# Patient Record
Sex: FEMALE | Race: WHITE | Employment: OTHER | URBAN - METROPOLITAN AREA
[De-identification: names, ages, dates, MRNs, and addresses within clinical notes are randomized per-mention and may not be internally consistent; named-entity substitution may affect disease eponyms.]

---

## 2021-04-06 ENCOUNTER — TELEPHONE (OUTPATIENT)
Dept: FAMILY MEDICINE CLINIC | Facility: CLINIC | Age: 86
End: 2021-04-06

## 2021-04-06 ENCOUNTER — OFFICE VISIT (OUTPATIENT)
Dept: FAMILY MEDICINE CLINIC | Facility: CLINIC | Age: 86
End: 2021-04-06
Payer: MEDICARE

## 2021-04-06 VITALS
HEART RATE: 64 BPM | RESPIRATION RATE: 18 BRPM | WEIGHT: 123 LBS | BODY MASS INDEX: 23.52 KG/M2 | HEIGHT: 60.5 IN | TEMPERATURE: 98 F | SYSTOLIC BLOOD PRESSURE: 134 MMHG | DIASTOLIC BLOOD PRESSURE: 70 MMHG

## 2021-04-06 DIAGNOSIS — N39.41 URGE URINARY INCONTINENCE: ICD-10-CM

## 2021-04-06 DIAGNOSIS — I10 BENIGN ESSENTIAL HYPERTENSION: Primary | ICD-10-CM

## 2021-04-06 DIAGNOSIS — E55.9 VITAMIN D DEFICIENCY: ICD-10-CM

## 2021-04-06 DIAGNOSIS — Z95.0 PRESENCE OF CARDIAC PACEMAKER: ICD-10-CM

## 2021-04-06 DIAGNOSIS — S81.801A OPEN WOUND OF RIGHT LOWER LEG, INITIAL ENCOUNTER: ICD-10-CM

## 2021-04-06 DIAGNOSIS — E78.00 PURE HYPERCHOLESTEROLEMIA: ICD-10-CM

## 2021-04-06 PROCEDURE — 93000 ELECTROCARDIOGRAM COMPLETE: CPT | Performed by: FAMILY MEDICINE

## 2021-04-06 PROCEDURE — 90471 IMMUNIZATION ADMIN: CPT | Performed by: FAMILY MEDICINE

## 2021-04-06 PROCEDURE — 99205 OFFICE O/P NEW HI 60 MIN: CPT | Performed by: FAMILY MEDICINE

## 2021-04-06 PROCEDURE — 90714 TD VACC NO PRESV 7 YRS+ IM: CPT | Performed by: FAMILY MEDICINE

## 2021-04-06 RX ORDER — ATORVASTATIN CALCIUM 10 MG/1
10 TABLET, FILM COATED ORAL DAILY
COMMUNITY
Start: 2021-03-18

## 2021-04-06 RX ORDER — METOPROLOL SUCCINATE 50 MG/1
50 TABLET, EXTENDED RELEASE ORAL DAILY
COMMUNITY
Start: 2021-03-18 | End: 2021-10-05

## 2021-04-06 RX ORDER — HYDROXYZINE HYDROCHLORIDE 25 MG/1
25 TABLET, FILM COATED ORAL
COMMUNITY
Start: 2021-03-18 | End: 2021-04-06

## 2021-04-06 RX ORDER — HYDROXYZINE HYDROCHLORIDE 25 MG/1
25 TABLET, FILM COATED ORAL 3 TIMES DAILY
COMMUNITY
End: 2021-04-29

## 2021-04-06 RX ORDER — HYDROXYZINE HYDROCHLORIDE 25 MG/1
25 TABLET, FILM COATED ORAL 3 TIMES DAILY
COMMUNITY
End: 2021-04-06

## 2021-04-06 NOTE — PROGRESS NOTES
HPI/Subjective:   Sylvia Bhardwaj is a 80year old female who presents for Consult (new pt. )     The patient is a 55-year-old female here for her first visit to our office. She has moved here from [place].   She is known to have a past history of PPM (Mo symmetrical, trachea midline and thyroid not enlarged, symmetric, no tenderness/mass/nodules  Lungs: clear to auscultation bilaterally  Heart: S1, S2 normal, no murmur, click, rub or gallop, regular rate and rhythm  Abdomen: soft, non-tender; bowel sounds

## 2021-04-06 NOTE — TELEPHONE ENCOUNTER
It is a type of antihistamine but does cause some sedation so it is used for anxiety/agitation.   If the daughter feels that this has been helpful we will leave it as it is

## 2021-04-06 NOTE — TELEPHONE ENCOUNTER
Call to Gail/daughter-listed on [employer] consent-home # in record reaches identified voice mail. Per hip[employer] consent, left m req call back to triage nurse today for dr instructions. Provided ofc phone hours.

## 2021-04-06 NOTE — TELEPHONE ENCOUNTER
Patient's daughter [facility] Verbal Release verified) was in and was told to call in with medication that was not listed. Hydroxyzine 25 mg PO TID -->has been taking for about 2.5 weeks. Daughter states that if Dr. Jens Hilton does not feel that this is not the right medication for her, she would like for it to be changed.     Medication list updated    Dr. Jens Hilton to advise, if any

## 2021-04-07 NOTE — TELEPHONE ENCOUNTER
Call to Gail/daughter-listed on hip[employer] consent-home # in record reaches identified voice mail. Per hipaa consent, left m req call back to triage nurse tomorrow for dr cooley. Provided ofc phone hours.

## 2021-04-08 ENCOUNTER — MED REC SCAN ONLY (OUTPATIENT)
Dept: FAMILY MEDICINE CLINIC | Facility: CLINIC | Age: 86
End: 2021-04-08

## 2021-04-12 PROBLEM — Z95.0 PRESENCE OF CARDIAC PACEMAKER: Status: ACTIVE | Noted: 2021-04-12

## 2021-04-12 PROBLEM — E55.9 VITAMIN D DEFICIENCY: Status: ACTIVE | Noted: 2021-04-12

## 2021-04-12 PROBLEM — E78.00 PURE HYPERCHOLESTEROLEMIA: Status: ACTIVE | Noted: 2021-04-12

## 2021-04-12 PROBLEM — N39.41 URGE URINARY INCONTINENCE: Status: ACTIVE | Noted: 2021-04-12

## 2021-04-12 PROBLEM — I10 BENIGN ESSENTIAL HYPERTENSION: Status: ACTIVE | Noted: 2021-04-12

## 2021-04-13 ENCOUNTER — OFFICE VISIT (OUTPATIENT)
Dept: FAMILY MEDICINE CLINIC | Facility: CLINIC | Age: 86
End: 2021-04-13
Payer: MEDICARE

## 2021-04-13 VITALS
DIASTOLIC BLOOD PRESSURE: 84 MMHG | BODY MASS INDEX: 23.52 KG/M2 | WEIGHT: 123 LBS | SYSTOLIC BLOOD PRESSURE: 146 MMHG | HEART RATE: 80 BPM | TEMPERATURE: 98 F | RESPIRATION RATE: 18 BRPM | HEIGHT: 60.5 IN

## 2021-04-13 DIAGNOSIS — S81.801D OPEN WOUND OF RIGHT LOWER LEG, SUBSEQUENT ENCOUNTER: ICD-10-CM

## 2021-04-13 DIAGNOSIS — L03.115 CELLULITIS OF RIGHT LOWER LEG: Primary | ICD-10-CM

## 2021-04-13 PROCEDURE — 99213 OFFICE O/P EST LOW 20 MIN: CPT | Performed by: FAMILY MEDICINE

## 2021-04-13 RX ORDER — AMOXICILLIN AND CLAVULANATE POTASSIUM 875; 125 MG/1; MG/1
1 TABLET, FILM COATED ORAL 2 TIMES DAILY
Qty: 14 TABLET | Refills: 0 | Status: SHIPPED | OUTPATIENT
Start: 2021-04-13 | End: 2021-04-20

## 2021-04-13 NOTE — TELEPHONE ENCOUNTER
SUSANNAH on daughter's cell patient has an appointment today to talk to  about this-I will close this encounter.

## 2021-04-19 ENCOUNTER — HOSPITAL ENCOUNTER (EMERGENCY)
Facility: HOSPITAL | Age: 86
Discharge: HOME | End: 2021-04-19
Attending: EMERGENCY MEDICINE
Payer: MEDICARE

## 2021-04-19 ENCOUNTER — APPOINTMENT (OUTPATIENT)
Dept: GENERAL RADIOLOGY | Facility: HOSPITAL | Age: 86
End: 2021-04-19
Attending: EMERGENCY MEDICINE
Payer: MEDICARE

## 2021-04-19 VITALS
RESPIRATION RATE: 18 BRPM | WEIGHT: 124 LBS | HEART RATE: 65 BPM | BODY MASS INDEX: 22.82 KG/M2 | SYSTOLIC BLOOD PRESSURE: 133 MMHG | TEMPERATURE: 98 F | DIASTOLIC BLOOD PRESSURE: 78 MMHG | OXYGEN SATURATION: 95 % | HEIGHT: 62 IN
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
## 2021-04-19 PROCEDURE — 99283 EMERGENCY DEPT VISIT LOW MDM: CPT

## 2021-04-19 PROCEDURE — 72170 X-RAY EXAM OF PELVIS: CPT | Performed by: EMERGENCY MEDICINE

## 2021-04-19 NOTE — ED INITIAL ASSESSMENT (HPI)
PT TO ED FROM HOME AFTER SLIPPING ON  WOOD FLOOR, DENIES HITTING HEAD OR LOC, NO BLOOD THINNERS.  DENIES INJURY OR PAIN

## 2021-04-19 NOTE — ED PROVIDER NOTES
Patient Seen in: [facility] Emergency Department      History   Patient presents with:  Fall    Stated Complaint: fall     HPI/Subjective:   HPI    59-year-old female with past medical history of hypertension, hyperlipidemia presents today following and reactive to light. Cardiovascular:      Rate and Rhythm: Normal rate and regular rhythm. Pulmonary:      Effort: Pulmonary effort is normal.      Breath sounds: Normal breath sounds. Abdominal:      Palpations: Abdomen is soft. Tenderness:  Jacquelynne Greet Rosalino Montes MD on 4/19/2021 at 1:37 PM              MDM      26-year-old female presents today following a mechanical fall. During my assessment, patient denies any physical complaints.   Her GCS is 15 and she has no gross neurological abnormalities on examinat

## 2021-04-19 NOTE — PROGRESS NOTES
Wound follow-up    Patient is here for follow-up of her leg wound of her right anterior shin. Her daughter believes the wound is smaller but she is now seen some erythema surrounding the 2 main areas. There has been no fever.   /84 (BP Location: Left

## 2021-04-29 ENCOUNTER — TELEPHONE (OUTPATIENT)
Dept: FAMILY MEDICINE CLINIC | Facility: CLINIC | Age: 86
End: 2021-04-29

## 2021-04-29 ENCOUNTER — PATIENT MESSAGE (OUTPATIENT)
Dept: FAMILY MEDICINE CLINIC | Facility: CLINIC | Age: 86
End: 2021-04-29

## 2021-04-29 RX ORDER — HYDROXYZINE HYDROCHLORIDE 25 MG/1
25 TABLET, FILM COATED ORAL 3 TIMES DAILY PRN
Qty: 30 TABLET | Refills: 0 | Status: SHIPPED | OUTPATIENT
Start: 2021-04-29 | End: 2021-05-03

## 2021-04-29 NOTE — TELEPHONE ENCOUNTER
Left msg to pt  callback    Pls clarify  Indication why taking? Epic showing external provider     Dr. Isabel Mast to refill this?    Pt has a recent ER visit d/t fall 4/19

## 2021-04-29 NOTE — TELEPHONE ENCOUNTER
Pt daughter would like to know if pt can have refill of hydrOXYzine HCl 25 MG Oral Tab sent to [facility] [facility], [address], [phone], [phone] please advise thank you pt is out of medication

## 2021-04-30 NOTE — TELEPHONE ENCOUNTER
Pt daughter called back on this. Confirms the rx is for anxiety, rx'd by previous Dr when pt was living in [place]. I let her know Dr Lola Styles to refill but the RN wanted to get clarification on the rx d/t pt's fall risk. Daughter voiced understanding.  Pt has a

## 2021-04-30 NOTE — TELEPHONE ENCOUNTER
LM for pt.s daughter Iqra Luna to [facility]. Derek Eli is the only one on Mothers HIPPA. My chart message was sent by pt.s  daughter in law., Dangelo Baez. Dr. Keira Mancia did recommend pt go to the UC or ER.

## 2021-04-30 NOTE — TELEPHONE ENCOUNTER
From: Shana Khan  To: Tia Doll MD  Sent: 4/29/2021 9:55 PM CDT  Subject: Visit Follow-up Question    I'm Willie Amaral, Sabine's daughter-in-law in [place] and a mental health professional. Emily Hurst had a period of coherence 4/15-20.  During the pa

## 2021-05-01 ENCOUNTER — APPOINTMENT (OUTPATIENT)
Dept: CT IMAGING | Facility: HOSPITAL | Age: 86
End: 2021-05-01
Attending: EMERGENCY MEDICINE
Payer: MEDICARE

## 2021-05-01 ENCOUNTER — HOSPITAL ENCOUNTER (EMERGENCY)
Facility: HOSPITAL | Age: 86
Discharge: HOME | End: 2021-05-01
Attending: EMERGENCY MEDICINE
Payer: MEDICARE

## 2021-05-01 VITALS
TEMPERATURE: 97 F | BODY MASS INDEX: 23 KG/M2 | WEIGHT: 125 LBS | HEIGHT: 62 IN | OXYGEN SATURATION: 96 % | SYSTOLIC BLOOD PRESSURE: 132 MMHG | DIASTOLIC BLOOD PRESSURE: 77 MMHG | RESPIRATION RATE: 18 BRPM | HEART RATE: 66 BPM

## 2021-05-01 DIAGNOSIS — S00.03XA CONTUSION OF SCALP, INITIAL ENCOUNTER: Primary | ICD-10-CM

## 2021-05-01 PROCEDURE — 70450 CT HEAD/BRAIN W/O DYE: CPT | Performed by: EMERGENCY MEDICINE

## 2021-05-01 PROCEDURE — 99284 EMERGENCY DEPT VISIT MOD MDM: CPT

## 2021-05-01 NOTE — ED INITIAL ASSESSMENT (HPI)
Pt here after losing balance in her assisted living facility. +hitting posterior head. Denies loc head neck/back pain. AAOx 3.

## 2021-05-01 NOTE — ED PROVIDER NOTES
Patient Seen in: [facility] Emergency Department      History   Patient presents with:  Fall    Stated Complaint: fall    HPI/Subjective:   HPI    63-year-old white female presents emerged from today for complaint of fall.   Patient that she turned a auscultation bilaterally. Heart is regular rate and rhythm without murmur gallop or rub    Abdomen is soft nondistended nontender to deep palpation there is no rebound or guarding noted no hepatosplenomegaly is noted. No masses are noted.   No hernias are encounter diagnosis)     Disposition:  There is no disposition on file for this visit. There is no disposition time on file for this visit.     Follow-up:  MD Kay Donnelly [phone]    In 2 days

## 2021-05-04 NOTE — PROGRESS NOTES
HPI/Subjective:   Adonay Powell is a 80year old female who presents for Follow - Up (cellulitis right lower leg. )     The patient is here for follow-up of her cellulitis of her right lower leg. She states that she has been feeling well.   There has bee rhythm  Extremities:  There are 2 healing scabs noted on the anterior right shin  EKG: Pacemaker evident, no acute changes    Assessment & Plan:   Problem List Items Addressed This Visit     None      Visit Diagnoses     Anxiety    -  Primary    Relevant Me

## 2021-05-06 ENCOUNTER — TELEPHONE (OUTPATIENT)
Dept: FAMILY MEDICINE CLINIC | Facility: CLINIC | Age: 86
End: 2021-05-06

## 2021-05-06 NOTE — TELEPHONE ENCOUNTER
Dr. Omar Armstrong (Podiatrist) called and states he saw pt and dx'd her with Infected Ulcer left 3 rd toe. Would like to treat her with Keflex 500 mg tid and probiotic.   Wanted to know if you are okay with the treatment plan since pt is new with in their off

## 2021-05-21 DIAGNOSIS — F41.9 ANXIETY: ICD-10-CM

## 2021-05-21 RX ORDER — ALPRAZOLAM 0.25 MG/1
0.25 TABLET ORAL 3 TIMES DAILY PRN
Qty: 30 TABLET | Refills: 0 | Status: SHIPPED | OUTPATIENT
Start: 2021-05-21 | End: 2021-05-27

## 2021-05-21 NOTE — TELEPHONE ENCOUNTER
Daughter Lorenza Monahan called and states pt is out of her medication. Had a 10 day starter of it. Everything went well no side effects and dizziness is gone.  She states her mom lives in a nursing home setting and would like to have a month supply at a time so sh

## 2021-05-26 NOTE — TELEPHONE ENCOUNTER
Call from dmitriy/daughter-listed on hip[employer] consent-mentions initial call info requesting 90 day alprazolam refill order instead of 10 day order at a time.    Reports pt \"doing very well, so much better since starting this med, it's almost like I have my own

## 2021-05-27 RX ORDER — ALPRAZOLAM 0.25 MG/1
0.25 TABLET ORAL 3 TIMES DAILY PRN
Qty: 90 TABLET | Refills: 2 | Status: SHIPPED | OUTPATIENT
Start: 2021-05-27 | End: 2021-10-28

## 2021-06-08 ENCOUNTER — TELEPHONE (OUTPATIENT)
Dept: FAMILY MEDICINE CLINIC | Facility: CLINIC | Age: 86
End: 2021-06-08

## 2021-06-09 ENCOUNTER — TELEPHONE (OUTPATIENT)
Dept: FAMILY MEDICINE CLINIC | Facility: CLINIC | Age: 86
End: 2021-06-09

## 2021-06-15 ENCOUNTER — TELEPHONE (OUTPATIENT)
Dept: FAMILY MEDICINE CLINIC | Facility: CLINIC | Age: 86
End: 2021-06-15

## 2021-06-15 NOTE — TELEPHONE ENCOUNTER
Staff member calling from  [facility] in [place] asking for H&P, med list, assessment form that was dropped off, CXR results, vaccinations. Still awaiting daughter's call about this.

## 2021-06-15 NOTE — TELEPHONE ENCOUNTER
LMOVM for pt.s daughter Smita Peers to [facility]. ( she is on her mothers HIPPA) I asked her to please ask for me. It is regarding the paperwork she dropped off for her mother.

## 2021-06-15 NOTE — TELEPHONE ENCOUNTER
Call from [address] RN/HUNG villar/ivanna-sts pt's family making arrangements fro pt to be transferred to this facility tomorrow. sts still waiting for clinical info from our ofc.    Advised adebayo of all info noted below-still waiting for call back fr

## 2021-06-16 ENCOUNTER — TELEPHONE (OUTPATIENT)
Dept: FAMILY MEDICINE CLINIC | Facility: CLINIC | Age: 86
End: 2021-06-16

## 2021-06-16 DIAGNOSIS — Z11.1 SCREENING FOR TUBERCULOSIS: Primary | ICD-10-CM

## 2021-06-16 PROCEDURE — 87086 URINE CULTURE/COLONY COUNT: CPT | Performed by: NURSE PRACTITIONER

## 2021-06-16 PROCEDURE — 81001 URINALYSIS AUTO W/SCOPE: CPT | Performed by: NURSE PRACTITIONER

## 2021-06-16 NOTE — TELEPHONE ENCOUNTER
Per Dr Gerardo Galvan he said if Herrera  is willing to sign/address this then she can complete today. Otherwise will have to wait until tomorrow. S/w daughter. She agrees to have chaz see her mom today on this. chaz updated.

## 2021-06-16 NOTE — PROGRESS NOTES
Em Lion is a 80year old female. HPI:   Patient presents today with her grandson, Stefanie Dutta for form completion and assessment.  She is currently living at [facility]- in independent living and grandson reports 1 week ago she was found wander use: Never       REVIEW OF SYSTEMS:   GENERAL HEALTH: feels well otherwise  RESPIRATORY: denies shortness of breath with exertion  CARDIOVASCULAR: denies chest pain on exertion  GI: denies abdominal pain  NEURO: denies headaches  Musculoskeletal: No motor president is. Patient frustrated that she cannot remember these answers. Discussed follow up with neurology for full evaluation regarding dementia. Referral placed for Dr. Elvira Kelly. Forms completed with assistance of daughter, Karyn Kamara available by phone.

## 2021-06-16 NOTE — TELEPHONE ENCOUNTER
Igor Blackman from [facility] called. Asking for 2 things  1. H/p from today  2. An order for quanteferon gold tb test or chest xray. They need this order today in order for pt to stay there. They have a lab that will draw her there tomorrow.      Per [facility]

## 2021-06-16 NOTE — TELEPHONE ENCOUNTER
Daughter called back on this. Mariama Celis this needs to be completed by today. I let her know I will need to call her back and locate forms Destiny she is referring to as destiny is not here today. She is ok in awaiting me to call back.

## 2021-06-16 NOTE — TELEPHONE ENCOUNTER
Daughter calling again on this. I told her I did locate form but it needs to be signed by Dr Kirk Velazquez and he is not here. Also the form is asking for assessment of her adl's. I told daughter I sent note to Dr Kirk Velazquez (secure chat) to see how to proceed on this.

## 2021-06-17 ENCOUNTER — NURSE ONLY (OUTPATIENT)
Dept: LAB | Age: 86
End: 2021-06-17
Attending: FAMILY MEDICINE
Payer: MEDICARE

## 2021-06-17 DIAGNOSIS — Z11.1 SCREENING-PULMONARY TB: Primary | ICD-10-CM

## 2021-06-17 PROCEDURE — 86480 TB TEST CELL IMMUN MEASURE: CPT

## 2021-06-22 ENCOUNTER — TELEPHONE (OUTPATIENT)
Dept: FAMILY MEDICINE CLINIC | Facility: CLINIC | Age: 86
End: 2021-06-22

## 2021-06-22 NOTE — TELEPHONE ENCOUNTER
Coretta Holter from [employer] called and would like to verify pt's Alprazolam 0.25 MG. Pt is new to them and would like to make sure.   Per Mandi Joyner, pt's medications states she is to take Alprazolam 025 mg 1 po TID as needed, but pt's

## 2021-06-24 ENCOUNTER — NURSE ONLY (OUTPATIENT)
Dept: LAB | Age: 86
End: 2021-06-24
Attending: FAMILY MEDICINE
Payer: MEDICARE

## 2021-06-24 DIAGNOSIS — Z11.1 SCREENING FOR TUBERCULOSIS: ICD-10-CM

## 2021-06-24 DIAGNOSIS — E55.9 VITAMIN D DEFICIENCY: ICD-10-CM

## 2021-06-24 DIAGNOSIS — D51.9 VITAMIN B12 DEFICIENCY ANEMIA: ICD-10-CM

## 2021-06-24 DIAGNOSIS — F41.9 ANXIETY: ICD-10-CM

## 2021-06-24 DIAGNOSIS — I10 ESSENTIAL HYPERTENSION, MALIGNANT: Primary | ICD-10-CM

## 2021-06-24 DIAGNOSIS — Z13.29 SCREENING FOR THYROID DISORDER: ICD-10-CM

## 2021-06-24 DIAGNOSIS — E78.5 HYPERLIPIDEMIA: ICD-10-CM

## 2021-06-24 PROCEDURE — 86480 TB TEST CELL IMMUN MEASURE: CPT

## 2021-06-24 PROCEDURE — 84443 ASSAY THYROID STIM HORMONE: CPT

## 2021-06-24 PROCEDURE — 80061 LIPID PANEL: CPT

## 2021-06-24 PROCEDURE — 82306 VITAMIN D 25 HYDROXY: CPT

## 2021-06-24 PROCEDURE — 80053 COMPREHEN METABOLIC PANEL: CPT

## 2021-06-24 PROCEDURE — 85025 COMPLETE CBC W/AUTO DIFF WBC: CPT

## 2021-06-24 PROCEDURE — 82607 VITAMIN B-12: CPT

## 2021-06-24 RX ORDER — ALPRAZOLAM 0.25 MG/1
0.25 TABLET ORAL 3 TIMES DAILY PRN
Qty: 90 TABLET | Refills: 2 | Status: CANCELLED | OUTPATIENT
Start: 2021-06-24

## 2021-06-28 ENCOUNTER — MED REC SCAN ONLY (OUTPATIENT)
Dept: FAMILY MEDICINE CLINIC | Facility: CLINIC | Age: 86
End: 2021-06-28

## 2021-06-29 ENCOUNTER — APPOINTMENT (OUTPATIENT)
Dept: CT IMAGING | Facility: HOSPITAL | Age: 86
End: 2021-06-29
Payer: MEDICARE

## 2021-06-29 ENCOUNTER — HOSPITAL ENCOUNTER (EMERGENCY)
Facility: HOSPITAL | Age: 86
Discharge: HOME | End: 2021-06-30
Payer: MEDICARE

## 2021-06-29 VITALS
SYSTOLIC BLOOD PRESSURE: 149 MMHG | TEMPERATURE: 97 F | DIASTOLIC BLOOD PRESSURE: 56 MMHG | HEART RATE: 63 BPM | OXYGEN SATURATION: 98 % | WEIGHT: 125 LBS | BODY MASS INDEX: 23 KG/M2 | RESPIRATION RATE: 18 BRPM | HEIGHT: 62 IN

## 2021-06-29 DIAGNOSIS — S00.03XA CONTUSION OF SCALP, INITIAL ENCOUNTER: Primary | ICD-10-CM

## 2021-06-29 PROCEDURE — 99284 EMERGENCY DEPT VISIT MOD MDM: CPT

## 2021-06-29 PROCEDURE — 70450 CT HEAD/BRAIN W/O DYE: CPT

## 2021-06-30 NOTE — ED INITIAL ASSESSMENT (HPI)
Patient to ED with daughter, reports she lives in memory care at [facility]. Daughter was called after patient fell. Unsure if patient hit her head or the fall was witness.   Patient is confused at baseline

## 2021-06-30 NOTE — ED PROVIDER NOTES
Patient Seen in: [facility] Emergency Department      History   Patient presents with:  Fall    Stated Complaint: fall hit her head     HPI/Subjective:   HPI    Patient had a mechanical fall today, she fell backwards and struck her head.   No LOC, no and reactive to light. Anterior chambers clear bilaterally. No periorbital changes. Neck: Normal range of motion. Neck supple. No JVD present. No bruising/abrasions. No hematomas. Normal voice. Cardiovascular: Normal rate and regular rhythm. specific for acute infarct. There is no hemorrhage or mass lesion. SINUSES:           No sign of acute sinusitis. MASTOIDS:          No sign of acute inflammation. SKULL:             No evidence for fracture or osseous abnormality.  OTHER:             No

## 2021-07-02 ENCOUNTER — MED REC SCAN ONLY (OUTPATIENT)
Dept: FAMILY MEDICINE CLINIC | Facility: CLINIC | Age: 86
End: 2021-07-02

## 2021-07-15 ENCOUNTER — HOSPITAL ENCOUNTER (EMERGENCY)
Facility: HOSPITAL | Age: 86
Discharge: HOME | End: 2021-07-15
Attending: EMERGENCY MEDICINE
Payer: MEDICARE

## 2021-07-15 ENCOUNTER — APPOINTMENT (OUTPATIENT)
Dept: CT IMAGING | Facility: HOSPITAL | Age: 86
End: 2021-07-15
Attending: EMERGENCY MEDICINE
Payer: MEDICARE

## 2021-07-15 ENCOUNTER — APPOINTMENT (OUTPATIENT)
Dept: GENERAL RADIOLOGY | Facility: HOSPITAL | Age: 86
End: 2021-07-15
Attending: EMERGENCY MEDICINE
Payer: MEDICARE

## 2021-07-15 VITALS
DIASTOLIC BLOOD PRESSURE: 74 MMHG | RESPIRATION RATE: 16 BRPM | SYSTOLIC BLOOD PRESSURE: 142 MMHG | TEMPERATURE: 98 F | HEIGHT: 63 IN | OXYGEN SATURATION: 98 % | BODY MASS INDEX: 21.87 KG/M2 | WEIGHT: 123.44 LBS | HEART RATE: 108 BPM

## 2021-07-15 DIAGNOSIS — S30.0XXA CONTUSION OF BUTTOCK, INITIAL ENCOUNTER: ICD-10-CM

## 2021-07-15 DIAGNOSIS — S09.90XA MINOR HEAD INJURY, INITIAL ENCOUNTER: ICD-10-CM
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
## 2021-07-15 PROCEDURE — 72190 X-RAY EXAM OF PELVIS: CPT | Performed by: EMERGENCY MEDICINE

## 2021-07-15 PROCEDURE — 99284 EMERGENCY DEPT VISIT MOD MDM: CPT

## 2021-07-15 PROCEDURE — 72220 X-RAY EXAM SACRUM TAILBONE: CPT | Performed by: EMERGENCY MEDICINE

## 2021-07-15 PROCEDURE — 70450 CT HEAD/BRAIN W/O DYE: CPT | Performed by: EMERGENCY MEDICINE

## 2021-07-16 NOTE — ED INITIAL ASSESSMENT (HPI)
Pt arrives via EMS from [facility]. Pt had unwitnessed fall in the dining room. Pt hit the back of her head, denies LOC. Pt denies head/neck pain, c/o tailbone pain. Pt arrives in ccollar. Pt not on blood thinners. Arrives A&O x4.

## 2021-07-16 NOTE — ED PROVIDER NOTES
Patient Seen in: [facility] Emergency Department      History   Patient presents with:  Fall    Stated Complaint: fall    HPI/Subjective:   HPI    80-year-old female presents to the emergency department after a fall while going to the dining room. Normal rate and regular rhythm. Pulses: Normal pulses. Heart sounds: Normal heart sounds. Pulmonary:      Effort: Pulmonary effort is normal.      Breath sounds: Normal breath sounds. No stridor. No wheezing.    Abdominal:      General: Bowel so 7/15/2021 at 9:42 PM       CT BRAIN OR HEAD ([id number])    Result Date: 7/15/2021  PROCEDURE:  CT BRAIN OR HEAD (98202)  COMPARISON:  [facility] , CT, CT BRAIN OR HEAD ([id number]), 6/29/2021, 10:55 PM.  INDICATIONS:  fall  TECHNIQUE:  Noncontrast CT scanning is perfor hitting back of head. FINDINGS:  VENTRICLES/SULCI:  Ventricles and sulci are mildly prominent consistent with atrophy. INTRACRANIAL:  There are no abnormal extraaxial fluid collections. There is no midline shift.   There are no intraparenchymal brain ab of any acute fracture. I reviewed the x-ray results with the patient and with her family members. She is at her baseline and would like to go home as well.   She was discharged in good condition                             Disposition and [address] [place] [address]

## 2021-08-11 ENCOUNTER — OFFICE VISIT (OUTPATIENT)
Dept: NEUROLOGY | Facility: CLINIC | Age: 86
End: 2021-08-11
Payer: MEDICARE

## 2021-08-11 VITALS
BODY MASS INDEX: 23.14 KG/M2 | WEIGHT: 129 LBS | SYSTOLIC BLOOD PRESSURE: 124 MMHG | DIASTOLIC BLOOD PRESSURE: 90 MMHG | RESPIRATION RATE: 18 BRPM | HEIGHT: 62.5 IN

## 2021-08-11 DIAGNOSIS — R41.3 MEMORY LOSS: Primary | ICD-10-CM

## 2021-08-11 DIAGNOSIS — E53.8 VITAMIN B12 DEFICIENCY: ICD-10-CM

## 2021-08-11 PROCEDURE — 99203 OFFICE O/P NEW LOW 30 MIN: CPT | Performed by: PHYSICIAN ASSISTANT

## 2021-08-11 RX ORDER — MEMANTINE HYDROCHLORIDE 5 MG/1
TABLET ORAL
Qty: 70 TABLET | Refills: 0 | Status: SHIPPED | OUTPATIENT
Start: 2021-08-11 | End: 2021-08-12

## 2021-08-11 RX ORDER — MEMANTINE HYDROCHLORIDE 10 MG/1
TABLET ORAL
Qty: 60 TABLET | Refills: 2 | Status: SHIPPED | OUTPATIENT
Start: 2021-08-11 | End: 2021-08-12

## 2021-08-12 ENCOUNTER — TELEPHONE (OUTPATIENT)
Dept: NEUROLOGY | Facility: CLINIC | Age: 86
End: 2021-08-12

## 2021-08-12 RX ORDER — MEMANTINE HYDROCHLORIDE 5 MG/1
TABLET ORAL
Qty: 70 TABLET | Refills: 0 | Status: SHIPPED | OUTPATIENT
Start: 2021-08-12 | End: 2021-08-13 | Stop reason: CLARIF

## 2021-08-12 RX ORDER — MEMANTINE HYDROCHLORIDE 10 MG/1
TABLET ORAL
Qty: 60 TABLET | Refills: 2 | Status: SHIPPED | OUTPATIENT
Start: 2021-08-12 | End: 2021-08-30 | Stop reason: ALTCHOICE

## 2021-08-12 NOTE — TELEPHONE ENCOUNTER
Please let Jose Vazquez know she has the Nikki XT-DR-MRI. And was placed on 02/20/2019  Jose Vazquez had order and MRI for her mother.

## 2021-08-13 ENCOUNTER — TELEPHONE (OUTPATIENT)
Dept: NEUROLOGY | Facility: CLINIC | Age: 86
End: 2021-08-13

## 2021-08-13 DIAGNOSIS — R41.3 MEMORY LOSS: Primary | ICD-10-CM

## 2021-08-13 RX ORDER — MEMANTINE HYDROCHLORIDE 5 MG/1
TABLET ORAL
Qty: 70 TABLET | Refills: 0 | Status: SHIPPED
Start: 2021-08-13

## 2021-08-13 RX ORDER — MEMANTINE HYDROCHLORIDE 5 MG/1
TABLET ORAL
Qty: 70 TABLET | Refills: 0 | Status: SHIPPED | OUTPATIENT
Start: 2021-08-13 | End: 2021-08-13

## 2021-08-13 NOTE — TELEPHONE ENCOUNTER
[facility]  pharmacy questioning Namenda titration. Orders say to increase to 2 tablets on second week, they believe if should be 3rd week. Please clarify.

## 2021-08-13 NOTE — TELEPHONE ENCOUNTER
Note that SIG on original titration Rx was mis-typed. For week two it should say 1 tab (NOT 2 tabs) BID. [facility] asking for updated Rx to be faxed to her attention at [phone]. New Rx entered as written order.   Printed and faxed with fax confirma

## 2021-08-14 DIAGNOSIS — R41.3 MEMORY LOSS: ICD-10-CM

## 2021-08-16 RX ORDER — MEMANTINE HYDROCHLORIDE 5 MG/1
TABLET ORAL
Qty: 70 TABLET | Refills: 0 | OUTPATIENT
Start: 2021-08-16

## 2021-08-17 ENCOUNTER — TELEPHONE (OUTPATIENT)
Dept: FAMILY MEDICINE CLINIC | Facility: CLINIC | Age: 86
End: 2021-08-17

## 2021-08-17 NOTE — TELEPHONE ENCOUNTER
Pt's daughter dropped off paper work; requesting Dr. Jimbo Anne fill out so that pt can move from [facility]  to [address] [place] [address] Drive living. Daughter apprised of potential paperwork fee.     Please call when complete; pt unable to move until paper work is comple

## 2021-08-23 NOTE — TELEPHONE ENCOUNTER
Form back to [address] [facility] to address. Pt's daughter Mia Rodas informed that it will have to wait for Billie Polanco to address. Voiced understanding.

## 2021-08-30 ENCOUNTER — OFFICE VISIT (OUTPATIENT)
Dept: FAMILY MEDICINE CLINIC | Facility: CLINIC | Age: 86
End: 2021-08-30
Payer: MEDICARE

## 2021-08-30 VITALS
BODY MASS INDEX: 25.02 KG/M2 | HEART RATE: 80 BPM | WEIGHT: 132.5 LBS | SYSTOLIC BLOOD PRESSURE: 144 MMHG | RESPIRATION RATE: 18 BRPM | TEMPERATURE: 98 F | DIASTOLIC BLOOD PRESSURE: 92 MMHG | HEIGHT: 61 IN

## 2021-08-30 DIAGNOSIS — N39.41 URGE URINARY INCONTINENCE: ICD-10-CM

## 2021-08-30 DIAGNOSIS — Z95.0 PRESENCE OF CARDIAC PACEMAKER: ICD-10-CM

## 2021-08-30 DIAGNOSIS — I10 BENIGN ESSENTIAL HYPERTENSION: ICD-10-CM

## 2021-08-30 DIAGNOSIS — F41.9 ANXIETY: ICD-10-CM

## 2021-08-30 DIAGNOSIS — E78.00 PURE HYPERCHOLESTEROLEMIA: ICD-10-CM

## 2021-08-30 DIAGNOSIS — R41.3 MEMORY CHANGES: ICD-10-CM

## 2021-08-30 DIAGNOSIS — R41.3 MEMORY LOSS: Primary | ICD-10-CM

## 2021-08-30 PROCEDURE — 99214 OFFICE O/P EST MOD 30 MIN: CPT | Performed by: NURSE PRACTITIONER

## 2021-08-30 NOTE — PROGRESS NOTES
Shadi Del Angel is a 80year old female. HPI:   Patient presents today with her daughter, Carin Perez. Patient needs forms completed to move to new senior living. She is currently living at [facility] about 4 months and now moving to [facility].  She Use      Vaping Use: Never used    Alcohol use: Yes      Comment: 1-2 shots of wine a night     Drug use: Never       REVIEW OF SYSTEMS:   GENERAL HEALTH: feels well otherwise  RESPIRATORY: denies shortness of breath with exertion  CARDIOVASCULAR: denies c this and need to schedule. In return call from [facility]- pt can do TB at their facility when she moves in. The patient indicates understanding of these issues and agrees to the plan.   The patient is asked to return in 3 months with Dr. Yaritza Feliz for fol

## 2021-09-08 ENCOUNTER — MED REC SCAN ONLY (OUTPATIENT)
Dept: FAMILY MEDICINE CLINIC | Facility: CLINIC | Age: 86
End: 2021-09-08

## 2021-09-13 ENCOUNTER — LAB ENCOUNTER (OUTPATIENT)
Dept: LAB | Age: 86
End: 2021-09-13
Attending: NURSE PRACTITIONER
Payer: MEDICARE

## 2021-09-13 ENCOUNTER — OFFICE VISIT (OUTPATIENT)
Dept: FAMILY MEDICINE CLINIC | Facility: CLINIC | Age: 86
End: 2021-09-13
Payer: MEDICARE

## 2021-09-13 VITALS
DIASTOLIC BLOOD PRESSURE: 78 MMHG | OXYGEN SATURATION: 98 % | BODY MASS INDEX: 25.11 KG/M2 | TEMPERATURE: 98 F | WEIGHT: 133 LBS | SYSTOLIC BLOOD PRESSURE: 122 MMHG | HEIGHT: 61 IN | RESPIRATION RATE: 16 BRPM | HEART RATE: 84 BPM

## 2021-09-13 DIAGNOSIS — Z11.1 SCREENING FOR TUBERCULOSIS: ICD-10-CM

## 2021-09-13 DIAGNOSIS — N39.41 URGE URINARY INCONTINENCE: Primary | ICD-10-CM

## 2021-09-13 DIAGNOSIS — R41.3 MEMORY CHANGES: ICD-10-CM

## 2021-09-13 DIAGNOSIS — R41.3 MEMORY LOSS: ICD-10-CM

## 2021-09-13 LAB
APPEARANCE: CLEAR
BILIRUBIN: NEGATIVE
GLUCOSE (URINE DIPSTICK): NEGATIVE MG/DL
KETONES (URINE DIPSTICK): NEGATIVE MG/DL
LEUKOCYTES: NEGATIVE
MULTISTIX LOT#: 5077 NUMERIC
NITRITE, URINE: NEGATIVE
OCCULT BLOOD: NEGATIVE
PH, URINE: 6.5 (ref 4.5–8)
PROTEIN (URINE DIPSTICK): NEGATIVE MG/DL
SPECIFIC GRAVITY: 1.02 (ref 1–1.03)
URINE-COLOR: YELLOW
UROBILINOGEN,SEMI-QN: 0.2 MG/DL (ref 0–1.9)

## 2021-09-13 PROCEDURE — 81003 URINALYSIS AUTO W/O SCOPE: CPT | Performed by: NURSE PRACTITIONER

## 2021-09-13 PROCEDURE — 99213 OFFICE O/P EST LOW 20 MIN: CPT | Performed by: NURSE PRACTITIONER

## 2021-09-13 PROCEDURE — 86480 TB TEST CELL IMMUN MEASURE: CPT

## 2021-09-13 PROCEDURE — 36415 COLL VENOUS BLD VENIPUNCTURE: CPT

## 2021-09-13 RX ORDER — TOLTERODINE 2 MG/1
2 CAPSULE, EXTENDED RELEASE ORAL DAILY
Qty: 90 CAPSULE | Refills: 1 | Status: SHIPPED | OUTPATIENT
Start: 2021-09-13 | End: 2022-01-10

## 2021-09-13 NOTE — PROGRESS NOTES
Chris Russ is a 80year old female. HPI:   Patient presents today with her daughter to discuss urinary incontinence- primarily at night. Has been ongoing for years. Reports she has no problems during the day.  She is taking Mirabegron- has been on x 4 Use      Vaping Use: Never used    Alcohol use: Yes      Comment: 1-2 shots of wine a night     Drug use: Never       REVIEW OF SYSTEMS:   GENERAL HEALTH: feels well otherwise  RESPIRATORY: denies shortness of breath with exertion  CARDIOVASCULAR: denies c of medication with the patient. Quantiferon TB as ordered. Continue Namenda per neurology. The patient/patient's daughter indicates understanding of these issues and agrees to the plan.   The patient is asked to return in 3 months med check/follow up w

## 2021-09-15 LAB
M TB IFN-G CD4+ T-CELLS BLD-ACNC: 0.04 IU/ML
M TB TUBERC IFN-G BLD QL: NEGATIVE
M TB TUBERC IGNF/MITOGEN IGNF CONTROL: >10 IU/ML
QFT TB1 AG MINUS NIL: -0.01 IU/ML
QFT TB2 AG MINUS NIL: -0.01 IU/ML

## 2021-10-05 ENCOUNTER — TELEPHONE (OUTPATIENT)
Dept: FAMILY MEDICINE CLINIC | Facility: CLINIC | Age: 86
End: 2021-10-05

## 2021-10-05 DIAGNOSIS — I10 BENIGN ESSENTIAL HYPERTENSION: ICD-10-CM

## 2021-10-05 RX ORDER — METOPROLOL SUCCINATE 50 MG/1
50 TABLET, EXTENDED RELEASE ORAL DAILY
Qty: 30 TABLET | Refills: 3 | Status: SHIPPED | OUTPATIENT
Start: 2021-10-05 | End: 2022-01-10

## 2021-10-11 ENCOUNTER — OFFICE VISIT (OUTPATIENT)
Dept: NEUROLOGY | Facility: CLINIC | Age: 86
End: 2021-10-11
Payer: MEDICARE

## 2021-10-11 VITALS
SYSTOLIC BLOOD PRESSURE: 126 MMHG | RESPIRATION RATE: 16 BRPM | HEART RATE: 72 BPM | DIASTOLIC BLOOD PRESSURE: 76 MMHG | WEIGHT: 136 LBS | BODY MASS INDEX: 26 KG/M2

## 2021-10-11 DIAGNOSIS — R41.3 MEMORY LOSS: Primary | ICD-10-CM

## 2021-10-11 DIAGNOSIS — E53.8 VITAMIN B12 DEFICIENCY: ICD-10-CM

## 2021-10-11 PROCEDURE — 99214 OFFICE O/P EST MOD 30 MIN: CPT | Performed by: PHYSICIAN ASSISTANT

## 2021-10-11 NOTE — PROGRESS NOTES
[facility] with [address]  2/15/1929  Primary Care Provider:  Silvestre Abernathy MD    10/11/2021  Accompanied visit: Yes-Daughter  Previous visit and existing record notes revi • Pacemaker        Medications:      Current Outpatient Medications:   •  metoprolol succinate 50 MG Oral Tablet 24 Hr, Take 1 tablet (50 mg total) by mouth daily. , Disp: 30 tablet, Rfl: 3  •  tolterodine tartrate 2 MG Oral Capsule SR 24 Hr, Take 1 capsu Orders:    Patient is a 80year old female here with her daughter for memory loss. Since her last visit there does not appear to be a significant change in her memory.  Explained to daughter that I would like them to proceed with MRI Brain as with her memor

## 2021-10-28 DIAGNOSIS — F41.9 ANXIETY: ICD-10-CM

## 2021-10-28 RX ORDER — ALPRAZOLAM 0.25 MG/1
0.25 TABLET ORAL 3 TIMES DAILY PRN
Qty: 90 TABLET | Refills: 0 | Status: SHIPPED | OUTPATIENT
Start: 2021-10-28 | End: 2021-12-06

## 2021-12-03 DIAGNOSIS — F41.9 ANXIETY: ICD-10-CM

## 2021-12-06 DIAGNOSIS — F41.9 ANXIETY: ICD-10-CM

## 2021-12-06 RX ORDER — ALPRAZOLAM 0.25 MG/1
TABLET ORAL
Qty: 90 TABLET | Refills: 0 | OUTPATIENT
Start: 2021-12-06

## 2021-12-06 RX ORDER — ALPRAZOLAM 0.25 MG/1
TABLET ORAL
Qty: 90 TABLET | Refills: 0 | Status: SHIPPED | OUTPATIENT
Start: 2021-12-06 | End: 2022-01-06

## 2021-12-06 NOTE — TELEPHONE ENCOUNTER
LOV: 8/30/21  for: Anxiety  Patient advised to RTC on:  3 months  Previous Rx:  Alprzolam 0.25mgà Sig: Take 1 tablet by mouth 3 times daily as needed. Disp #90/0 refills.   LF: 10/28/21

## 2022-01-06 DIAGNOSIS — F41.9 ANXIETY: ICD-10-CM

## 2022-01-06 RX ORDER — ALPRAZOLAM 0.25 MG/1
0.25 TABLET ORAL 3 TIMES DAILY PRN
Qty: 90 TABLET | Refills: 0 | Status: SHIPPED | OUTPATIENT
Start: 2022-01-06

## 2022-01-10 ENCOUNTER — TELEPHONE (OUTPATIENT)
Dept: FAMILY MEDICINE CLINIC | Facility: CLINIC | Age: 87
End: 2022-01-10

## 2022-01-10 DIAGNOSIS — I10 BENIGN ESSENTIAL HYPERTENSION: ICD-10-CM

## 2022-01-10 DIAGNOSIS — N39.41 URGE URINARY INCONTINENCE: ICD-10-CM

## 2022-01-10 RX ORDER — TOLTERODINE 2 MG/1
2 CAPSULE, EXTENDED RELEASE ORAL DAILY
Qty: 90 CAPSULE | Refills: 1 | Status: SHIPPED | OUTPATIENT
Start: 2022-01-10

## 2022-01-10 RX ORDER — METOPROLOL SUCCINATE 50 MG/1
50 TABLET, EXTENDED RELEASE ORAL DAILY
Qty: 30 TABLET | Refills: 1 | Status: SHIPPED | OUTPATIENT
Start: 2022-01-10

## 2022-01-10 NOTE — TELEPHONE ENCOUNTER
Pt has an appointment with Dr Cyr Liter 1/18/22. Pt last refill was 9/13/21 #90 + 1. Pt should be okay until appointment?   Please confirm or sign pended refill

## 2022-01-11 ENCOUNTER — TELEPHONE (OUTPATIENT)
Dept: FAMILY MEDICINE CLINIC | Facility: CLINIC | Age: 87
End: 2022-01-11

## 2022-01-11 NOTE — TELEPHONE ENCOUNTER
Yasemin Oswald LPN from [facility] called and wanted to verify pt's Xanax direction. States pt has been taking Xanax 0.25 mg TID, but the new RX states pt is to take it TID PRN. Please advise.   According to our records, pt is to take it TID P

## 2022-01-12 NOTE — TELEPHONE ENCOUNTER
Call from rosalba ADAME/[facility] assisted [facility]-requesting status of question called to office yesterday. Advised rosalba of info below regarding attempt to call her back yesteday. Advised of dr Prisca Genao instructions noted below.  rosalba Patient voices

## 2022-01-13 ENCOUNTER — HOSPITAL ENCOUNTER (OUTPATIENT)
Dept: MRI IMAGING | Facility: HOSPITAL | Age: 87
Discharge: HOME | End: 2022-01-13
Attending: PHYSICIAN ASSISTANT
Payer: MEDICARE

## 2022-01-13 VITALS — HEART RATE: 80 BPM | SYSTOLIC BLOOD PRESSURE: 134 MMHG | DIASTOLIC BLOOD PRESSURE: 65 MMHG | OXYGEN SATURATION: 95 %

## 2022-01-13 DIAGNOSIS — R41.3 MEMORY LOSS: ICD-10-CM

## 2022-01-13 PROCEDURE — A9575 INJ GADOTERATE MEGLUMI 0.1ML: HCPCS | Performed by: PHYSICIAN ASSISTANT

## 2022-01-13 PROCEDURE — 70553 MRI BRAIN STEM W/O & W/DYE: CPT | Performed by: PHYSICIAN ASSISTANT

## 2022-01-13 NOTE — IMAGING NOTE
Dylan Edmond here for MRI brain with and without constrast. Pacemaker order given to Ligia Madrigal Rep. Patient's pacemaker was placed on MRI-safe mode. Positioned on scanner. VS checked during procedure. Pt tolerated procedure.  Rep restored pacem

## 2022-01-17 ENCOUNTER — TELEPHONE (OUTPATIENT)
Dept: NEUROLOGY | Facility: CLINIC | Age: 87
End: 2022-01-17

## 2022-01-17 NOTE — TELEPHONE ENCOUNTER
Left voice-mail message for patient's daughter Miguel Martinez to call back regarding MRI Brain results.

## 2022-01-18 ENCOUNTER — OFFICE VISIT (OUTPATIENT)
Dept: FAMILY MEDICINE CLINIC | Facility: CLINIC | Age: 87
End: 2022-01-18
Payer: MEDICARE

## 2022-01-18 ENCOUNTER — LAB ENCOUNTER (OUTPATIENT)
Dept: LAB | Age: 87
End: 2022-01-18
Attending: FAMILY MEDICINE
Payer: MEDICARE

## 2022-01-18 VITALS
TEMPERATURE: 99 F | HEART RATE: 80 BPM | HEIGHT: 61 IN | DIASTOLIC BLOOD PRESSURE: 62 MMHG | WEIGHT: 139 LBS | SYSTOLIC BLOOD PRESSURE: 90 MMHG | RESPIRATION RATE: 18 BRPM | BODY MASS INDEX: 26.24 KG/M2

## 2022-01-18 DIAGNOSIS — I49.9 IRREGULAR HEARTBEAT: ICD-10-CM

## 2022-01-18 DIAGNOSIS — I10 BENIGN ESSENTIAL HYPERTENSION: ICD-10-CM

## 2022-01-18 DIAGNOSIS — E78.00 PURE HYPERCHOLESTEROLEMIA: ICD-10-CM

## 2022-01-18 DIAGNOSIS — E55.9 VITAMIN D DEFICIENCY: ICD-10-CM

## 2022-01-18 DIAGNOSIS — I10 BENIGN ESSENTIAL HYPERTENSION: Primary | ICD-10-CM

## 2022-01-18 DIAGNOSIS — G47.10 HYPERSOMNIA: ICD-10-CM

## 2022-01-18 LAB
ALBUMIN SERPL-MCNC: 3.9 G/DL (ref 3.4–5)
ALBUMIN/GLOB SERPL: 1.3 {RATIO} (ref 1–2)
ALP LIVER SERPL-CCNC: 96 U/L
ALT SERPL-CCNC: 21 U/L
ANION GAP SERPL CALC-SCNC: 3 MMOL/L (ref 0–18)
AST SERPL-CCNC: 22 U/L (ref 15–37)
BASOPHILS # BLD AUTO: 0.06 X10(3) UL (ref 0–0.2)
BASOPHILS NFR BLD AUTO: 0.9 %
BILIRUB SERPL-MCNC: 0.6 MG/DL (ref 0.1–2)
BUN BLD-MCNC: 36 MG/DL (ref 7–18)
CALCIUM BLD-MCNC: 9.3 MG/DL (ref 8.5–10.1)
CHLORIDE SERPL-SCNC: 108 MMOL/L (ref 98–112)
CHOLEST SERPL-MCNC: 152 MG/DL (ref ?–200)
CO2 SERPL-SCNC: 30 MMOL/L (ref 21–32)
CREAT BLD-MCNC: 1.46 MG/DL
EOSINOPHIL # BLD AUTO: 0.12 X10(3) UL (ref 0–0.7)
EOSINOPHIL NFR BLD AUTO: 1.9 %
ERYTHROCYTE [DISTWIDTH] IN BLOOD BY AUTOMATED COUNT: 13.3 %
FASTING PATIENT LIPID ANSWER: YES
FASTING STATUS PATIENT QL REPORTED: YES
GLOBULIN PLAS-MCNC: 2.9 G/DL (ref 2.8–4.4)
GLUCOSE BLD-MCNC: 102 MG/DL (ref 70–99)
HCT VFR BLD AUTO: 39.4 %
HDLC SERPL-MCNC: 74 MG/DL (ref 40–59)
HGB BLD-MCNC: 12.9 G/DL
IMM GRANULOCYTES # BLD AUTO: 0.02 X10(3) UL (ref 0–1)
IMM GRANULOCYTES NFR BLD: 0.3 %
LDLC SERPL CALC-MCNC: 62 MG/DL (ref ?–100)
LYMPHOCYTES # BLD AUTO: 1.85 X10(3) UL (ref 1–4)
LYMPHOCYTES NFR BLD AUTO: 28.5 %
MCH RBC QN AUTO: 31.9 PG (ref 26–34)
MCHC RBC AUTO-ENTMCNC: 32.7 G/DL (ref 31–37)
MCV RBC AUTO: 97.3 FL
MONOCYTES # BLD AUTO: 0.78 X10(3) UL (ref 0.1–1)
MONOCYTES NFR BLD AUTO: 12 %
NEUTROPHILS # BLD AUTO: 3.65 X10 (3) UL (ref 1.5–7.7)
NEUTROPHILS # BLD AUTO: 3.65 X10(3) UL (ref 1.5–7.7)
NEUTROPHILS NFR BLD AUTO: 56.4 %
NONHDLC SERPL-MCNC: 78 MG/DL (ref ?–130)
OSMOLALITY SERPL CALC.SUM OF ELEC: 301 MOSM/KG (ref 275–295)
PLATELET # BLD AUTO: 161 10(3)UL (ref 150–450)
POTASSIUM SERPL-SCNC: 4.8 MMOL/L (ref 3.5–5.1)
PROT SERPL-MCNC: 6.8 G/DL (ref 6.4–8.2)
RBC # BLD AUTO: 4.05 X10(6)UL
SODIUM SERPL-SCNC: 141 MMOL/L (ref 136–145)
T4 FREE SERPL-MCNC: 1.1 NG/DL (ref 0.8–1.7)
TRIGL SERPL-MCNC: 88 MG/DL (ref 30–149)
TSI SER-ACNC: 2.17 MIU/ML (ref 0.36–3.74)
VIT D+METAB SERPL-MCNC: 34.6 NG/ML (ref 30–100)
VLDLC SERPL CALC-MCNC: 13 MG/DL (ref 0–30)
WBC # BLD AUTO: 6.5 X10(3) UL (ref 4–11)

## 2022-01-18 PROCEDURE — 93000 ELECTROCARDIOGRAM COMPLETE: CPT | Performed by: FAMILY MEDICINE

## 2022-01-18 PROCEDURE — 80053 COMPREHEN METABOLIC PANEL: CPT

## 2022-01-18 PROCEDURE — 80061 LIPID PANEL: CPT

## 2022-01-18 PROCEDURE — 84439 ASSAY OF FREE THYROXINE: CPT

## 2022-01-18 PROCEDURE — 99215 OFFICE O/P EST HI 40 MIN: CPT | Performed by: FAMILY MEDICINE

## 2022-01-18 PROCEDURE — 85025 COMPLETE CBC W/AUTO DIFF WBC: CPT

## 2022-01-18 PROCEDURE — 82306 VITAMIN D 25 HYDROXY: CPT

## 2022-01-18 PROCEDURE — 84443 ASSAY THYROID STIM HORMONE: CPT

## 2022-01-18 RX ORDER — MIRABEGRON 50 MG/1
TABLET, FILM COATED, EXTENDED RELEASE ORAL
COMMUNITY
Start: 2022-01-01

## 2022-01-18 NOTE — PROGRESS NOTES
La Nena Vidales is a 80year old female. HPI:   Patient presents today with her daughter, Donte Olson. Patient is currently living at [facility]. She is seeing neurology for her memory loss/memory changes, she has an appointment coming up.  Has been do REVIEW OF SYSTEMS:   GENERAL HEALTH: feels well otherwise  RESPIRATORY: denies shortness of breath with exertion  CARDIOVASCULAR: denies chest pain on exertion  GI: denies abdominal pain  NEURO: denies headaches  Musculoskeletal: No motor deficits  EXA

## 2022-01-19 DIAGNOSIS — R79.89 CREATININE ELEVATION: Primary | ICD-10-CM

## 2022-02-01 ENCOUNTER — LAB ENCOUNTER (OUTPATIENT)
Dept: LAB | Facility: HOSPITAL | Age: 87
End: 2022-02-01
Attending: PHYSICIAN ASSISTANT
Payer: MEDICARE

## 2022-02-01 DIAGNOSIS — R79.89 CREATININE ELEVATION: ICD-10-CM

## 2022-02-01 DIAGNOSIS — R41.3 MEMORY LOSS: ICD-10-CM

## 2022-02-01 LAB
ANION GAP SERPL CALC-SCNC: 4 MMOL/L (ref 0–18)
BUN BLD-MCNC: 32 MG/DL (ref 7–18)
CALCIUM BLD-MCNC: 9.7 MG/DL (ref 8.5–10.1)
CHLORIDE SERPL-SCNC: 110 MMOL/L (ref 98–112)
CO2 SERPL-SCNC: 29 MMOL/L (ref 21–32)
FASTING STATUS PATIENT QL REPORTED: NO
FOLATE SERPL-MCNC: 27.6 NG/ML (ref 8.7–?)
GLUCOSE BLD-MCNC: 98 MG/DL (ref 70–99)
OSMOLALITY SERPL CALC.SUM OF ELEC: 303 MOSM/KG (ref 275–295)
POTASSIUM SERPL-SCNC: 5.3 MMOL/L (ref 3.5–5.1)
SODIUM SERPL-SCNC: 143 MMOL/L (ref 136–145)
VIT B12 SERPL-MCNC: 760 PG/ML (ref 193–986)

## 2022-02-01 PROCEDURE — 82607 VITAMIN B-12: CPT

## 2022-02-01 PROCEDURE — 36415 COLL VENOUS BLD VENIPUNCTURE: CPT

## 2022-02-01 PROCEDURE — 80048 BASIC METABOLIC PNL TOTAL CA: CPT

## 2022-02-01 PROCEDURE — 82746 ASSAY OF FOLIC ACID SERUM: CPT

## 2022-02-03 ENCOUNTER — NURSE ONLY (OUTPATIENT)
Dept: LAB | Age: 87
End: 2022-02-03
Attending: FAMILY MEDICINE
Payer: MEDICARE

## 2022-02-03 DIAGNOSIS — D51.9 ANEMIA DUE TO VITAMIN B12 DEFICIENCY, UNSPECIFIED B12 DEFICIENCY TYPE: ICD-10-CM

## 2022-02-03 DIAGNOSIS — E55.9 VITAMIN D DEFICIENCY: Primary | ICD-10-CM

## 2022-02-03 DIAGNOSIS — I10 BENIGN ESSENTIAL HYPERTENSION: ICD-10-CM

## 2022-02-03 DIAGNOSIS — E78.00 PURE HYPERCHOLESTEROLEMIA: ICD-10-CM

## 2022-02-03 DIAGNOSIS — E78.5 HYPERLIPIDEMIA: ICD-10-CM

## 2022-02-03 LAB
ALBUMIN SERPL-MCNC: 3.8 G/DL (ref 3.4–5)
ALBUMIN/GLOB SERPL: 1.2 {RATIO} (ref 1–2)
ALP LIVER SERPL-CCNC: 86 U/L
ALT SERPL-CCNC: 21 U/L
ANION GAP SERPL CALC-SCNC: 5 MMOL/L (ref 0–18)
AST SERPL-CCNC: 20 U/L (ref 15–37)
BASOPHILS # BLD AUTO: 0.07 X10(3) UL (ref 0–0.2)
BASOPHILS NFR BLD AUTO: 1 %
BILIRUB SERPL-MCNC: 0.6 MG/DL (ref 0.1–2)
BUN BLD-MCNC: 23 MG/DL (ref 7–18)
CALCIUM BLD-MCNC: 9.2 MG/DL (ref 8.5–10.1)
CHLORIDE SERPL-SCNC: 110 MMOL/L (ref 98–112)
CHOLEST SERPL-MCNC: 165 MG/DL (ref ?–200)
CO2 SERPL-SCNC: 26 MMOL/L (ref 21–32)
CREAT BLD-MCNC: 1.08 MG/DL
EOSINOPHIL # BLD AUTO: 0.17 X10(3) UL (ref 0–0.7)
EOSINOPHIL NFR BLD AUTO: 2.5 %
ERYTHROCYTE [DISTWIDTH] IN BLOOD BY AUTOMATED COUNT: 13.2 %
FASTING PATIENT LIPID ANSWER: YES
FASTING STATUS PATIENT QL REPORTED: YES
GLOBULIN PLAS-MCNC: 3.2 G/DL (ref 2.8–4.4)
GLUCOSE BLD-MCNC: 104 MG/DL (ref 70–99)
HCT VFR BLD AUTO: 42.9 %
HDLC SERPL-MCNC: 74 MG/DL (ref 40–59)
HGB BLD-MCNC: 14 G/DL
IMM GRANULOCYTES # BLD AUTO: 0.02 X10(3) UL (ref 0–1)
IMM GRANULOCYTES NFR BLD: 0.3 %
LDLC SERPL CALC-MCNC: 73 MG/DL (ref ?–100)
LYMPHOCYTES # BLD AUTO: 2.52 X10(3) UL (ref 1–4)
LYMPHOCYTES NFR BLD AUTO: 37.1 %
MCH RBC QN AUTO: 32 PG (ref 26–34)
MCHC RBC AUTO-ENTMCNC: 32.6 G/DL (ref 31–37)
MCV RBC AUTO: 97.9 FL
MONOCYTES # BLD AUTO: 0.56 X10(3) UL (ref 0.1–1)
MONOCYTES NFR BLD AUTO: 8.2 %
NEUTROPHILS # BLD AUTO: 3.45 X10 (3) UL (ref 1.5–7.7)
NEUTROPHILS # BLD AUTO: 3.45 X10(3) UL (ref 1.5–7.7)
NEUTROPHILS NFR BLD AUTO: 50.9 %
NONHDLC SERPL-MCNC: 91 MG/DL (ref ?–130)
OSMOLALITY SERPL CALC.SUM OF ELEC: 296 MOSM/KG (ref 275–295)
PLATELET # BLD AUTO: 183 10(3)UL (ref 150–450)
POTASSIUM SERPL-SCNC: 5.3 MMOL/L (ref 3.5–5.1)
PROT SERPL-MCNC: 7 G/DL (ref 6.4–8.2)
RBC # BLD AUTO: 4.38 X10(6)UL
SODIUM SERPL-SCNC: 141 MMOL/L (ref 136–145)
TRIGL SERPL-MCNC: 100 MG/DL (ref 30–149)
TSI SER-ACNC: 2.89 MIU/ML (ref 0.36–3.74)
VIT B12 SERPL-MCNC: 776 PG/ML (ref 193–986)
VIT D+METAB SERPL-MCNC: 53.7 NG/ML (ref 30–100)
VLDLC SERPL CALC-MCNC: 15 MG/DL (ref 0–30)
WBC # BLD AUTO: 6.8 X10(3) UL (ref 4–11)

## 2022-02-03 PROCEDURE — 80053 COMPREHEN METABOLIC PANEL: CPT

## 2022-02-03 PROCEDURE — 84443 ASSAY THYROID STIM HORMONE: CPT

## 2022-02-03 PROCEDURE — 85025 COMPLETE CBC W/AUTO DIFF WBC: CPT

## 2022-02-03 PROCEDURE — 82607 VITAMIN B-12: CPT

## 2022-02-03 PROCEDURE — 82306 VITAMIN D 25 HYDROXY: CPT

## 2022-02-03 PROCEDURE — 80061 LIPID PANEL: CPT

## 2022-02-05 NOTE — PROGRESS NOTES
[facility] with [address]  2/15/1929  Primary Care Provider:  Marvel Garza MD    8/11/2021  Accompanied visit: Yes- Daughter    80year old female patient being seen for: po bid x 1 week, then 1 tab po qam and 2 tabs po at bedtime x 1 week, then 2 tabs po bid, Disp: 70 tablet, Rfl: 0  •  ALPRAZolam 0.25 MG Oral Tab, Take 1 tablet (0.25 mg total) by mouth 3 (three) times daily as needed. , Disp: 90 tablet, Rfl: 2  •  atorvast end of June. Recommended starting vitamin B12 1000mcg daily. Will start her on Namenda. (X) Discussed potential side effects of any treatment relevant to above. Includes explanation of tests as necessary.     Return in about 3 months (around 11/11/202 GOAL: Pt will perform ALL transfers (I), w/use of appropriate assistive device as needed, in 4 weeks.

## 2022-02-28 RX ORDER — METOPROLOL SUCCINATE 50 MG/1
50 TABLET, EXTENDED RELEASE ORAL DAILY
Qty: 90 TABLET | Refills: 0 | Status: SHIPPED | OUTPATIENT
Start: 2022-02-28

## 2022-03-09 NOTE — TELEPHONE ENCOUNTER
See below. Last fill 5.3  Asking for 30 day supply. Rx pended if you agree. 3/9/2022 0609  Gross per 24 hour   Intake 960 ml   Output 950 ml   Net 10 ml     Last 3 weights: Wt Readings from Last 3 Encounters:   03/07/22 198 lb 13.7 oz (90.2 kg)   02/05/21 212 lb 15.4 oz (96.6 kg)   05/23/16 223 lb (101.2 kg)       Physical Examination:   General appearance - alert, well appearing, and in no distress  Mental status - alert, oriented to person, place, and time  PERRLA wnl  Chest - clear to auscultation, no wheezes, rales or rhonchi, symmetric air entry  Heart - normal rate, regular rhythm, normal S1, S2, no murmurs, rubs, clicks or gallops  Abdomen - soft, nontender, nondistended, no masses or organomegaly  Neurological - alert, oriented, normal speech, no focal findings or movement disorder noted}  Extremities -still gangrenous second toe from the middle phalanx and distal phalanx. The foot itself the redness is fading away. He has evidence of amputation of the big toes  Skin - normal coloration and turgor, no rashes, no suspicious skin lesions noted     Component Value Units    Culture, Blood 1 [[id number]] (Abnormal)    Collected: 03/07/22 1247    Updated: 03/09/22 0814    Specimen Source: Blood     Specimen Description . BLOOD LT AC. UNK VOL. Culture POSITIVE Blood Culture     DIRECT GRAM STAIN FROM BOTTLE: GRAM POSITIVE COCCI IN CLUSTERS     STAPHYLOCOCCUS AUREUS Abnormal      (NOTE) Direct Gram Stain from bottle result called to and read back by:KATELYN MONROY  AT 0845 ON 3/8/22   CBC with Auto Differential [[id number]] (Abnormal)    Collected: 03/09/22 0556    Updated: 03/09/22 0734    Specimen Source: Blood     WBC 3.8 k/uL    RBC 3.41 Low  m/uL    Hemoglobin 10.8 Low  g/dL    Hematocrit 30.5 Low  %    MCV 89.5 fL    MCH 31.8 pg    MCHC 35.5 g/dL    RDW 14.8 %    Platelets 053 ZEK  k/uL    MPV 7.9 fL    Seg Neutrophils 71 High  %    Lymphocytes 14 Low  %    Monocytes 13 High  %    Eosinophils % 1 %    Basophils 1 %    Segs Absolute 2.70 k/uL    Absolute Lymph # 0.53 Low  k/uL Absolute Mono # 0.49 k/uL    Absolute Eos # 0.04 k/uL    Basophils Absolute 0.04 k/uL    Morphology ANISOCYTOSIS PRESENT   Basic Metabolic Panel [[id number]] (Abnormal)    Collected: 03/09/22 0556    Updated: 03/09/22 0639    Specimen Source: Blood     Glucose 54 Low  mg/dL    BUN 31 High  mg/dL    CREATININE 1.92 High  mg/dL    Calcium 8.7 mg/dL    Sodium 135 mmol/L    Potassium 3.8 mmol/L    Chloride 98 mmol/L    CO2 23 mmol/L    Anion Gap 14 mmol/L    GFR Non-African American 35 Low  mL/min    GFR African American 43 Low  mL/min    GFR Comment         Comment: Average GFR for 61-76 years old:    85 mL/min/1.73sq m   Chronic Kidney Disease:    <60 mL/min/1.73sq m   Kidney failure:    <15 mL/min/1.73sq m               eGFR calculated using average adult body mass. Additional eGFR calculator available at:         [email or web address]             CK [[id number]]    Collected: 03/09/22 0556    Updated: 03/09/22 0639    Specimen Source: Blood     Total CK 99 U/L   Hepatic Function Panel [[id number]] (Abnormal)    Collected: 03/09/22 0556    Updated: 03/09/22 0639    Specimen Source: Blood     Albumin 3.1 Low  g/dL    Alkaline Phosphatase 112 U/L    ALT 23 U/L    AST 34 U/L    Total Bilirubin 1.65 High  mg/dL    Bilirubin, Direct 1.12 High  mg/dL    Bilirubin, Indirect 0.53 mg/dL    Total Protein 6.3 Low  g/dL   POC Glucose Fingerstick [[id number]] (Abnormal)    Collected: 03/09/22 0626    Updated: 03/09/22 0637     POC Glucose 50 Low  mg/dL    Comment: Critical Noted      PROTEIN ELECTROPHORESIS, URINE [[id number]]    Collected: 03/08/22 2033    Updated: 03/09/22 0243    Specimen Source: Urine, clean catch     Specimen Type . CLEAN CATCH URINE    Urine Total Protein 147 mg/dL    P E Interpretation, U PENDING    Pathologist PENDING   Kappa/Lambda Quantitative Free Light Chains, Serum [[id number]] (Abnormal)    Collected: 03/08/22 1516    Updated: 03/08/22 2217    Specimen Source: Blood Kappa Free Light Chains QNT 5.94 High  mg/dL    Lambda Free Light Chains QNT 4.05 High  mg/dL    Free Kappa/Lambda Ratio 1.47   Electrophoresis Protein, Serum [[id number]] (Abnormal)    Collected: 03/08/22 1516    Updated: 03/08/22 2159    Specimen Source: Blood     Total Protein 6.2 Low  g/dL    Albumin (calculated) PENDING g/dL    Albumin % PENDING %    Alpha-1-Globulin PENDING g/dL    Alpha 1 % PENDING %    Alpha-2-Globulin PENDING g/dL    Alpha 2 % PENDING %    Beta Globulin PENDING g/dL    Beta Percent PENDING %    Gamma Globulin PENDING g/dL    Gamma Globulin % PENDING %    Total Prot. Sum PENDING g/dL    Total Prot. Sum,% PENDING %    Protein Electrophoresis, Serum PENDING    Pathologist PENDING   Protein / creatinine ratio, urine [[id number]] (Abnormal)    Collected: 03/08/22 2033    Updated: 03/08/22 2058     Total Protein, Urine 147 mg/dL    Comment: No normal range established. Creatinine, Ur 99.5 mg/dL    Urine Total Protein Creatinine Ratio 1.48 High    SODIUM, URINE, RANDOM [[id number]]    Collected: 03/08/22 2033    Updated: 03/08/22 2058    Specimen Source: Urine, clean catch     Sodium,Ur 78 mmol/L    Comment: No normal range established. Urea nitrogen, urine [[id number]]    Collected: 03/08/22 2033    Updated: 03/08/22 2058    Specimen Source: Urine, clean catch     Urea Nitrogen, Ur 535 mg/dL    Comment: No normal range established.       Urinalysis with Reflex to Culture [[id number]] (Abnormal)    Collected: 03/08/22 2034    Updated: 03/08/22 2046    Specimen Source: Urine, clean catch     Color, UA Dark Yellow Abnormal     Turbidity UA Clear    Glucose, Ur NEGATIVE    Bilirubin Urine NEGATIVE    Ketones, Urine NEGATIVE    Specific [place], UA 1.014    Urine Hgb TRACE Abnormal     pH, UA 5.5    Protein, UA 2+ Abnormal     Urobilinogen, Urine Normal    Nitrite, Urine NEGATIVE    Leukocyte Esterase, Urine NEGATIVE   Microscopic Urinalysis [[id number]]    Collected: 03/08/22 2034 Updated: 03/08/22 2046     WBC, UA 0 TO 2 /HPF    RBC, UA 3 to 5 /HPF    Casts UA 0 TO 2 /LPF    Epithelial Cells UA 0 TO 2 /HPF    Bacteria, UA None   C3 COMPLEMENT [[id number]]    Collected: 03/08/22 1516    Updated: 03/08/22 1930    Specimen Source: Blood     Complement C3 137 mg/dL   C4 COMPLEMENT [[id number]]    Collected: 03/08/22 1516    Updated: 03/08/22 1930    Specimen Source: Blood     Complement C4 27 mg/dL   US RENAL COMPLETE [[id number]]    Collected: 03/08/22 1728    Updated: 03/08/22 1925    Narrative:     EXAMINATION:   RETROPERITONEAL ULTRASOUND OF THE KIDNEYS AND URINARY BLADDER     3/8/2022     COMPARISON:   None     HISTORY:   ORDERING SYSTEM PROVIDED HISTORY: renal insuf   TECHNOLOGIST PROVIDED HISTORY:     renal insuf     FINDINGS:     Kidneys: The right kidney measures 12.7 cm in length and the left kidney measures 13   cm in length. There is normal renal cortical echogenicity.  There are bilateral stones   without hydronephrosis. Sabi Knuckles is a 3.4 x 3 x 2.7 cm simple appearing left   renal cyst. Sabi Knuckles is also a 1.1 x 0.7 x 1.3 cm simple appearing right renal   cyst.       Bladder:     Unremarkable appearance of the bladder. Impression:     1. Bilateral nonobstructing nephrolithiasis.  No hydronephrosis. 2. Simple appearing bilateral renal cysts measuring up to 3.4 cm on the left.     POC Glucose Fingerstick [[id number]] (Abnormal)    Collected: 03/08/22 1912    Updated: 03/08/22 1918     POC Glucose 115 High  mg/dL   POC Glucose Fingerstick [[id number]] (Abnormal)    Collected: 03/08/22 1603    Updated: 03/08/22 1616     POC Glucose 70 Low  mg/dL   AZIZA SCREEN WITH REFLEX [[id number]]    Collected: 03/08/22 1516    Updated: 03/08/22 1516    Specimen Source: Blood    ANTI-NEUTROPHIL ANTIBODY [[id number]]    Collected: 03/08/22 1516    Updated: 03/08/22 1516    Specimen Type: Blood    Culture, Blood 2 [[id number]]    Collected: 03/07/22 1255    Updated: 03/08/22 1430    Specimen Source: Blood     Specimen Description . BLOOD LT WRIST. UNK VOL. Culture POSITIVE Blood Culture     DIRECT GRAM STAIN FROM BOTTLE: GRAM POSITIVE COCCI IN CLUSTERS     Staphylococcus aureus Detected: mecA/C and MREJ Gene Detected- Methicillin Resistant Organism Methodology- Polymerase Chain Reaction (PCR)     (NOTE) Direct Gram Stain from bottle and Polymerase Chain Reaction (PCR) results called to and read back by:KATELYN MONROY AT 0845 ON 3/8/22   Hepatic Function Panel [[id number]] (Abnormal)    Collected: 03/08/22 0543    Updated: 03/08/22 1307    Specimen Source: Blood     Albumin 3.6 g/dL    Alkaline Phosphatase 112 U/L    ALT 19 U/L    AST 23 U/L    Total Bilirubin 1.55 High  mg/dL    Bilirubin, Direct 0.83 High  mg/dL    Bilirubin, Indirect 0.72 mg/dL    Total Protein 6.7 g/dL   VL Lower Extremity Arterial Segmental Pressures W Ppg [[id number]]    Collected: 03/08/22 3985    Updated: 03/08/22 1302    Narrative:         [facility]    Vascular Lower Arterial Plethysmography Procedure        Patient Name    JODI     Date of Study             03/08/2022                    JONO CUNNINGHAM        Date of Birth   1954   Gender                    Male        Age             79 year(s)   Race                              Room Number     2052        Corporate ID #  [id number]        Patient Acct # [de-identified]        MR #            [id number]       Sonographer               Zaida Jimenez        Accession #     [id number]   Interpreting Physician   Yvette Vila        Referring Nurse              Referring Physician       Tabitha Angela DPM    Practitioner       Procedure   Type of Study:        Extremities Arteries: Lower Arterial Plethysmography, PVR Lower with PPG.     Indications for Study:PVD. Patient Status: In Patient.    Technical Quality:Limited visualization.      Conclusions        Summary        Bilateral lower extremity ABIs and PVRs (with toe pressures) were    performed for the evaluation of peripheral vascular disease. Study    demonstrates:        Right:    Normal PVRs    Dampened digit waveforms suggesting microvascular level of disease    MONIQUE could not be calculated due to non-compressibility.        Left:    Normal PVRs    MONIQUE suggests no vascular insufficiency at rest    Dampened digit waveforms suggesting microvascular level of disease    MONIQUE could not be calculated due to non-compressibility.        Signature        ----------------------------------------------------------------    Electronically signed by Cesar Cast(Sonographer) on    03/08/2022 09:15 AM    ----------------------------------------------------------------        ----------------------------------------------------------------    Electronically signed by Aria Nevarez([address]) on 03/08/2022 01:01 PM    ----------------------------------------------------------------       Findings:        Right Impression:                    Left Impression:    Normal waveforms at the thigh, calf  Normal waveforms at the thigh, calf    and ankle levels.                    and ankle levels.        Diminished digit waveforms.          Diminished digit waveforms.        MONIQUE could not be calculated due to   MONIQUE could not be calculated due to    non-compressibility.                 non-compressibility.       Allergies     - Allergy:Penicillin(Drug). Velocities are measured in cm/s ; Diameters are measured in cm     Pressures   +------------+----+------------+---------+--------+------------+-----------+   !            !    ! Right       !         ! Left    !            !           !   +------------+----+------------+---------+--------+------------+-----------+   ! Location    !    !Pressure    !Ratio    !        !Pressure    !Ratio      !   +------------+----+------------+---------+--------+------------+-----------+   ! Ankle PT    !    !255         !1.71     !        !255         !1.71       ! +------------+----+------------+---------+--------+------------+-----------+   ! Ankle DP    !    !255         !1.71     !        !255         !1.71       !   +------------+----+------------+---------+--------+------------+-----------+       - Brachial Pressure:Right: 149. Left:149.       - MONIQUE:Right: 1.71. Left: 1.71. POC Glucose Fingerstick [[id number]] (Abnormal)    Collected: 03/08/22 1105    Updated: 03/08/22 1114     POC Glucose 118 High  mg/dL   Magnesium [[id number]]    Collected: 03/08/22 0543    Updated: 03/08/22 0845     Magnesium 1.7          Assessment:  Principal Problem:    Diabetic foot infection ([facility]  Resolved Problems:    * No resolved hospital problems.  *     Diabetic foot infection ([facility]) E11.628, L08.9    Diabetes ([facility] E11.9    Chronic osteomyelitis ([facility]) M86.60    Diabetic foot ulcer ([facility] E11.621, L97.509    PVD (peripheral vascular disease) ([facility] I73.9    Atherosclerosis of coronary artery without angina pectoris I25.10    Cardiomyopathy ([facility] I42.9    Cardiac left ventricular ejection fraction 21-40 percent R94.30    Diabetes mellitus type 2 with peripheral artery disease ([facility]) E11.51    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    Chronic osteomyelitis of left foot ([facility]) R83.535    Onychomycosis B35.1    Heart failure ([facility]) I50.9      · Cellulitis of left foot  ·    · Gangrenous left big toe questionable osteomyelitis no evidence of that on CT  ·    · Renal insufficiency and probably chronic because that MRI probably would not be done secondary to requirement of IV dye  · With acute on chronic kidney injury  · Diabetes mellitus  ·    · History of peripheral vascular disease  ·    · History of previous amputations  ·    · Elevated inflammatory markers consistent with his infection  · Mild hypokalemia         Plan:  15. Continue with above medication antibiotics  16.   17. We will see what podiatry and surgeons decide to do with his toe  18.   19. If no surgery he might need long-term IV antibiotics    Gemini Ramsey DO FAAFP            3/9/2022, 8:27 AM

## 2022-03-10 ENCOUNTER — NURSE ONLY (OUTPATIENT)
Dept: LAB | Age: 87
End: 2022-03-10
Attending: FAMILY MEDICINE
Payer: MEDICARE

## 2022-03-10 DIAGNOSIS — Z95.0 PRESENCE OF CARDIAC PACEMAKER: Primary | ICD-10-CM

## 2022-03-10 DIAGNOSIS — E55.9 VITAMIN D DEFICIENCY: ICD-10-CM

## 2022-03-10 DIAGNOSIS — E78.5 HYPERLIPIDEMIA: ICD-10-CM

## 2022-03-10 LAB
ANION GAP SERPL CALC-SCNC: 4 MMOL/L (ref 0–18)
BUN BLD-MCNC: 24 MG/DL (ref 7–18)
CALCIUM BLD-MCNC: 8.8 MG/DL (ref 8.5–10.1)
CHLORIDE SERPL-SCNC: 110 MMOL/L (ref 98–112)
CO2 SERPL-SCNC: 28 MMOL/L (ref 21–32)
CREAT BLD-MCNC: 1.24 MG/DL
FASTING STATUS PATIENT QL REPORTED: YES
GLUCOSE BLD-MCNC: 124 MG/DL (ref 70–99)
OSMOLALITY SERPL CALC.SUM OF ELEC: 299 MOSM/KG (ref 275–295)
POTASSIUM SERPL-SCNC: 4.6 MMOL/L (ref 3.5–5.1)
SODIUM SERPL-SCNC: 142 MMOL/L (ref 136–145)

## 2022-03-10 PROCEDURE — 80048 BASIC METABOLIC PNL TOTAL CA: CPT

## 2022-04-12 ENCOUNTER — TELEPHONE (OUTPATIENT)
Dept: NEUROLOGY | Facility: CLINIC | Age: 87
End: 2022-04-12

## 2022-04-12 NOTE — TELEPHONE ENCOUNTER
Pt daughter call,  her mother will be in a memory program and they need a letter with Dx and  stating why she needs to be in these program

## 2022-04-13 RX ORDER — DONEPEZIL HYDROCHLORIDE 5 MG/1
TABLET, FILM COATED ORAL
Qty: 90 TABLET | Refills: 1 | Status: SHIPPED | OUTPATIENT
Start: 2022-04-13

## 2022-04-13 RX ORDER — MEMANTINE HYDROCHLORIDE 10 MG/1
TABLET ORAL
Qty: 180 TABLET | Refills: 1 | Status: SHIPPED | OUTPATIENT
Start: 2022-04-13

## 2022-04-25 RX ORDER — TOLTERODINE 2 MG/1
2 CAPSULE, EXTENDED RELEASE ORAL DAILY
Qty: 90 CAPSULE | Refills: 1 | OUTPATIENT
Start: 2022-04-25

## 2022-05-09 RX ORDER — TOLTERODINE 2 MG/1
2 CAPSULE, EXTENDED RELEASE ORAL DAILY
Qty: 90 CAPSULE | Refills: 1 | Status: SHIPPED | OUTPATIENT
Start: 2022-05-09

## 2022-05-25 RX ORDER — DONEPEZIL HYDROCHLORIDE 5 MG/1
TABLET, FILM COATED ORAL
Qty: 90 TABLET | Refills: 2 | Status: SHIPPED | OUTPATIENT
Start: 2022-05-25

## 2022-06-09 ENCOUNTER — TELEPHONE (OUTPATIENT)
Dept: NEUROLOGY | Facility: CLINIC | Age: 87
End: 2022-06-09

## 2022-06-09 RX ORDER — OLANZAPINE 2.5 MG/1
TABLET ORAL
Qty: 30 TABLET | Refills: 5 | Status: SHIPPED | OUTPATIENT
Start: 2022-06-09

## 2022-06-09 NOTE — TELEPHONE ENCOUNTER
MALU LM for the daughter-Alexandria advising she will inform Dr. Luci Suarez she is continually forgetting things. Please advise. Advised she should contact the PCP about her anxiety. Dr. Geena Yip handles her anxiety medication. RN advised to call with any further questions.

## 2022-06-10 NOTE — TELEPHONE ENCOUNTER
Spoke with daughter Rajeev Leyva, mother does have medication and she believes she took it last night but has not heard from facility nurse, so assumes that all is ok. Patient adjusting to [facility] in [place], family is thrilled with services offered there. LM for patient and mother (per consent) that medication ordered at local pharmacy, take as directed and call with any questions or issues.

## 2022-06-13 NOTE — TELEPHONE ENCOUNTER
Received notice of potential clinical concern of;    Drug-disease Interaction: Dementia and antipsychotics in >=79years of age    [de-identified] to patient's daughter Donte Olson who states she spent the day with her mother over the weekend and she was doing well, is eating now, went shopping and spent 4 hours together without any issues or arguments. Donte Olson is unaware of any side effects her mother is having while on the OLANZAPINE. Alexandria agree's to follow up with this office regarding any new information related to this medication. Notice placed in Dr Jakub Patel office bin for review/consideration.

## 2022-06-14 ENCOUNTER — TELEPHONE (OUTPATIENT)
Dept: NEUROLOGY | Facility: CLINIC | Age: 87
End: 2022-06-14

## 2022-06-14 NOTE — TELEPHONE ENCOUNTER
Received drug interaction for olazapine from [facility] and reviewed by Dr. Ling La.     Copy to scanning

## 2022-06-29 ENCOUNTER — TELEPHONE (OUTPATIENT)
Dept: NEUROLOGY | Facility: CLINIC | Age: 87
End: 2022-06-29

## 2022-06-29 NOTE — TELEPHONE ENCOUNTER
Parameds requested medical records from 6/29/21 to present. To be completed by scan stat.   Copy to scanning

## 2022-07-26 ENCOUNTER — TELEPHONE (OUTPATIENT)
Dept: NEUROLOGY | Facility: CLINIC | Age: 87
End: 2022-07-26

## 2022-07-26 NOTE — TELEPHONE ENCOUNTER
Received long term disability benefits form from [address] [place] [address] in [place] for completion.

## 2022-08-03 NOTE — TELEPHONE ENCOUNTER
RN spoke to provider and confirmed we can complete the paperwork. RN spoke to the daughter and confirmed we are working on the paperwork. RN spoke to the patient's daughter and was able to answer the ADL and driving and safety questions. Yolandai Peabody is going to complete cognitive portion of the paperwork. Daughter confirmed once completed we are to fax the paperwork to [facility] at [phone]. Send a copy to her in the mail. Daughter confirmed she spoke to medical records to submit their request for the 2 years of medical records they requested. [employer] group is requesting Cognitive Testing- RN will send OVN of any cognitive testing done during the office visits.

## 2022-08-04 NOTE — TELEPHONE ENCOUNTER
RN faxed paperwork to [facility] [employer] [facility] at [phone]. Fax confirmation received. Copy sent to scan.

## 2022-09-06 ENCOUNTER — TELEPHONE (OUTPATIENT)
Dept: NEUROLOGY | Facility: CLINIC | Age: 87
End: 2022-09-06

## 2022-09-06 NOTE — TELEPHONE ENCOUNTER
Daughter reports that patient's anxiety is \"off the charts\". She thinks that the dementia is increasing; forgetting who her daughter is, etc.    Daughter says that anxiety is causing her to almost get sick. She is currently taking olanzapine 2.5 mg, and probably receiving daily as a schedule medication. Could this medication be increased? Will request recommendations from Dr. Estrada Eubanks. Patient has upcoming visit in November.

## 2022-09-16 NOTE — TELEPHONE ENCOUNTER
Please advise if they can increase the Alprazolam 0.25mg 3 (three) times PRN. Pt's anxiety is increasing and becoming more of an issue daily. The daughter would like to increase the dose. Please advise.

## 2022-09-16 NOTE — TELEPHONE ENCOUNTER
Patient's daughter calling, notes that anxiety has continued to get worse. Wants to know if we can slightly increase dosage of medication. Please advise.

## 2022-09-19 ENCOUNTER — OFFICE VISIT (OUTPATIENT)
Dept: FAMILY MEDICINE CLINIC | Facility: CLINIC | Age: 87
End: 2022-09-19
Payer: MEDICARE

## 2022-09-19 VITALS
DIASTOLIC BLOOD PRESSURE: 72 MMHG | HEIGHT: 59.45 IN | TEMPERATURE: 97 F | HEART RATE: 68 BPM | WEIGHT: 140.81 LBS | BODY MASS INDEX: 28.01 KG/M2 | RESPIRATION RATE: 25 BRPM | SYSTOLIC BLOOD PRESSURE: 124 MMHG

## 2022-09-19 DIAGNOSIS — Z00.00 ENCOUNTER FOR ANNUAL HEALTH EXAMINATION: Primary | ICD-10-CM

## 2022-09-19 DIAGNOSIS — N39.41 URGE URINARY INCONTINENCE: ICD-10-CM

## 2022-09-19 DIAGNOSIS — R26.81 GAIT INSTABILITY: ICD-10-CM

## 2022-09-19 DIAGNOSIS — Z12.31 ENCOUNTER FOR SCREENING MAMMOGRAM FOR MALIGNANT NEOPLASM OF BREAST: ICD-10-CM

## 2022-09-19 DIAGNOSIS — I10 BENIGN ESSENTIAL HYPERTENSION: ICD-10-CM

## 2022-09-19 DIAGNOSIS — E78.00 PURE HYPERCHOLESTEROLEMIA: ICD-10-CM

## 2022-09-19 DIAGNOSIS — Z95.0 PRESENCE OF CARDIAC PACEMAKER: ICD-10-CM

## 2022-09-19 DIAGNOSIS — F03.90 DEMENTIA ARISING IN THE SENIUM AND PRESENIUM (HCC): ICD-10-CM

## 2022-09-19 DIAGNOSIS — E55.9 VITAMIN D DEFICIENCY: ICD-10-CM

## 2022-09-19 PROBLEM — R26.89 FUNCTIONAL GAIT ABNORMALITY: Status: ACTIVE | Noted: 2022-09-19
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
## 2022-09-19 PROBLEM — R53.1 GENERALIZED WEAKNESS: Status: ACTIVE | Noted: 2022-09-19

## 2022-09-19 RX ORDER — TOLTERODINE 2 MG/1
2 CAPSULE, EXTENDED RELEASE ORAL DAILY
Qty: 90 CAPSULE | Refills: 1 | Status: SHIPPED | OUTPATIENT
Start: 2022-09-19

## 2022-09-19 RX ORDER — METOPROLOL SUCCINATE 50 MG/1
50 TABLET, EXTENDED RELEASE ORAL DAILY
Qty: 90 TABLET | Refills: 1 | Status: SHIPPED | OUTPATIENT
Start: 2022-09-19

## 2022-09-19 RX ORDER — ATORVASTATIN CALCIUM 10 MG/1
10 TABLET, FILM COATED ORAL DAILY
Qty: 90 TABLET | Refills: 0 | Status: SHIPPED | OUTPATIENT
Start: 2022-09-19

## 2022-09-21 NOTE — TELEPHONE ENCOUNTER
Spoke with daughter.   Olanzapine can be increased to BID but has to be cleared by PCP   That is much preferred over increasing benzodiazepine    Also advised that the behavioral management of Demented patient gets outside the comfort zone of what a NEurologist's role is in their care and rightfully belongs to both the PCP and a Geriatric Psychiatrist,   Advised them to be referred to Dr Maria Esther Moreno or Scott Carlos at [facility] [place]    Dr. Elsa Kim

## 2023-03-20 ENCOUNTER — OFFICE VISIT (OUTPATIENT)
Dept: FAMILY MEDICINE CLINIC | Facility: CLINIC | Age: 88
End: 2023-03-20
Payer: MEDICARE

## 2023-03-20 ENCOUNTER — LAB ENCOUNTER (OUTPATIENT)
Dept: LAB | Age: 88
End: 2023-03-20
Attending: FAMILY MEDICINE
Payer: MEDICARE

## 2023-03-20 VITALS
HEIGHT: 60 IN | DIASTOLIC BLOOD PRESSURE: 68 MMHG | TEMPERATURE: 99 F | RESPIRATION RATE: 18 BRPM | SYSTOLIC BLOOD PRESSURE: 110 MMHG | BODY MASS INDEX: 27.09 KG/M2 | WEIGHT: 138 LBS | HEART RATE: 72 BPM

## 2023-03-20 DIAGNOSIS — Z95.0 PRESENCE OF CARDIAC PACEMAKER: ICD-10-CM

## 2023-03-20 DIAGNOSIS — E78.00 PURE HYPERCHOLESTEROLEMIA: ICD-10-CM

## 2023-03-20 DIAGNOSIS — I10 BENIGN ESSENTIAL HYPERTENSION: Primary | ICD-10-CM

## 2023-03-20 DIAGNOSIS — F03.90 DEMENTIA WITHOUT BEHAVIORAL DISTURBANCE (HCC): ICD-10-CM

## 2023-03-20 DIAGNOSIS — N39.41 URGE URINARY INCONTINENCE: ICD-10-CM

## 2023-03-20 DIAGNOSIS — I10 BENIGN ESSENTIAL HYPERTENSION: ICD-10-CM

## 2023-03-20 DIAGNOSIS — E55.9 VITAMIN D DEFICIENCY: ICD-10-CM

## 2023-03-20 LAB
ALBUMIN SERPL-MCNC: 3.4 G/DL (ref 3.4–5)
ALBUMIN/GLOB SERPL: 1.1 {RATIO} (ref 1–2)
ALP LIVER SERPL-CCNC: 105 U/L
ALT SERPL-CCNC: 28 U/L
ANION GAP SERPL CALC-SCNC: 5 MMOL/L (ref 0–18)
AST SERPL-CCNC: 31 U/L (ref 15–37)
BASOPHILS # BLD AUTO: 0.05 X10(3) UL (ref 0–0.2)
BASOPHILS NFR BLD AUTO: 0.9 %
BILIRUB SERPL-MCNC: 0.4 MG/DL (ref 0.1–2)
BUN BLD-MCNC: 24 MG/DL (ref 7–18)
CALCIUM BLD-MCNC: 8.7 MG/DL (ref 8.5–10.1)
CHLORIDE SERPL-SCNC: 108 MMOL/L (ref 98–112)
CHOLEST SERPL-MCNC: 170 MG/DL (ref ?–200)
CO2 SERPL-SCNC: 28 MMOL/L (ref 21–32)
CREAT BLD-MCNC: 1.26 MG/DL
EOSINOPHIL # BLD AUTO: 0.1 X10(3) UL (ref 0–0.7)
EOSINOPHIL NFR BLD AUTO: 1.8 %
ERYTHROCYTE [DISTWIDTH] IN BLOOD BY AUTOMATED COUNT: 13.9 %
FASTING PATIENT LIPID ANSWER: NO
FASTING STATUS PATIENT QL REPORTED: NO
GFR SERPLBLD BASED ON 1.73 SQ M-ARVRAT: 40 ML/MIN/1.73M2 (ref 60–?)
GLOBULIN PLAS-MCNC: 3.1 G/DL (ref 2.8–4.4)
GLUCOSE BLD-MCNC: 138 MG/DL (ref 70–99)
HCT VFR BLD AUTO: 39.3 %
HDLC SERPL-MCNC: 74 MG/DL (ref 40–59)
HGB BLD-MCNC: 12.6 G/DL
IMM GRANULOCYTES # BLD AUTO: 0.03 X10(3) UL (ref 0–1)
IMM GRANULOCYTES NFR BLD: 0.5 %
LDLC SERPL CALC-MCNC: 71 MG/DL (ref ?–100)
LYMPHOCYTES # BLD AUTO: 1.72 X10(3) UL (ref 1–4)
LYMPHOCYTES NFR BLD AUTO: 31.3 %
MCH RBC QN AUTO: 31.6 PG (ref 26–34)
MCHC RBC AUTO-ENTMCNC: 32.1 G/DL (ref 31–37)
MCV RBC AUTO: 98.5 FL
MONOCYTES # BLD AUTO: 0.76 X10(3) UL (ref 0.1–1)
MONOCYTES NFR BLD AUTO: 13.8 %
NEUTROPHILS # BLD AUTO: 2.84 X10 (3) UL (ref 1.5–7.7)
NEUTROPHILS # BLD AUTO: 2.84 X10(3) UL (ref 1.5–7.7)
NEUTROPHILS NFR BLD AUTO: 51.7 %
NONHDLC SERPL-MCNC: 96 MG/DL (ref ?–130)
OSMOLALITY SERPL CALC.SUM OF ELEC: 298 MOSM/KG (ref 275–295)
PLATELET # BLD AUTO: 173 10(3)UL (ref 150–450)
POTASSIUM SERPL-SCNC: 4.3 MMOL/L (ref 3.5–5.1)
PROT SERPL-MCNC: 6.5 G/DL (ref 6.4–8.2)
RBC # BLD AUTO: 3.99 X10(6)UL
SODIUM SERPL-SCNC: 141 MMOL/L (ref 136–145)
TRIGL SERPL-MCNC: 146 MG/DL (ref 30–149)
VIT D+METAB SERPL-MCNC: 52.5 NG/ML (ref 30–100)
VLDLC SERPL CALC-MCNC: 22 MG/DL (ref 0–30)
WBC # BLD AUTO: 5.5 X10(3) UL (ref 4–11)

## 2023-03-20 PROCEDURE — 80061 LIPID PANEL: CPT

## 2023-03-20 PROCEDURE — 85025 COMPLETE CBC W/AUTO DIFF WBC: CPT

## 2023-03-20 PROCEDURE — 82306 VITAMIN D 25 HYDROXY: CPT

## 2023-03-20 PROCEDURE — 99214 OFFICE O/P EST MOD 30 MIN: CPT | Performed by: FAMILY MEDICINE

## 2023-03-20 PROCEDURE — 80053 COMPREHEN METABOLIC PANEL: CPT

## 2023-03-20 RX ORDER — MIRABEGRON 50 MG/1
TABLET, FILM COATED, EXTENDED RELEASE ORAL
COMMUNITY
Start: 2023-02-12 | End: 2023-03-20 | Stop reason: ALTCHOICE

## 2023-07-21 ENCOUNTER — TELEPHONE (OUTPATIENT)
Dept: FAMILY MEDICINE CLINIC | Facility: CLINIC | Age: 88
End: 2023-07-21

## 2023-08-14 ENCOUNTER — TELEPHONE (OUTPATIENT)
Dept: NEUROLOGY | Facility: CLINIC | Age: 88
End: 2023-08-14

## 2023-08-14 NOTE — TELEPHONE ENCOUNTER
Received via fax from [facility];    Order for OLANZAPINE 2.5 mg oral tab- 1 tab by mouth one time daily as needed for anxiety. Signed order faxed to [facility] with confirmation fax.     Copy to scan

## 2023-11-29 ENCOUNTER — TELEPHONE (OUTPATIENT)
Dept: FAMILY MEDICINE CLINIC | Facility: CLINIC | Age: 88
End: 2023-11-29

## 2024-03-27 ENCOUNTER — PATIENT OUTREACH (OUTPATIENT)
Dept: FAMILY MEDICINE CLINIC | Facility: CLINIC | Age: 89
End: 2024-03-27

## 2024-07-11 ENCOUNTER — OFFICE VISIT (OUTPATIENT)
Dept: FAMILY MEDICINE CLINIC | Facility: CLINIC | Age: 89
End: 2024-07-11
Payer: MEDICARE

## 2024-07-11 VITALS
WEIGHT: 146.81 LBS | TEMPERATURE: 97 F | SYSTOLIC BLOOD PRESSURE: 110 MMHG | DIASTOLIC BLOOD PRESSURE: 80 MMHG | RESPIRATION RATE: 16 BRPM | BODY MASS INDEX: 28.82 KG/M2 | HEIGHT: 60 IN | HEART RATE: 60 BPM

## 2024-07-11 DIAGNOSIS — I10 BENIGN ESSENTIAL HYPERTENSION: ICD-10-CM

## 2024-07-11 DIAGNOSIS — Z00.00 ENCOUNTER FOR MEDICARE ANNUAL WELLNESS EXAM: Primary | ICD-10-CM

## 2024-07-11 DIAGNOSIS — E78.00 PURE HYPERCHOLESTEROLEMIA: ICD-10-CM

## 2024-07-11 DIAGNOSIS — I83.893 VARICOSE VEINS OF BOTH LEGS WITH EDEMA: ICD-10-CM

## 2024-07-11 DIAGNOSIS — I44.2 ATRIOVENTRICULAR BLOCK, COMPLETE (HCC): ICD-10-CM

## 2024-07-11 DIAGNOSIS — Z95.0 PRESENCE OF CARDIAC PACEMAKER: ICD-10-CM

## 2024-07-11 DIAGNOSIS — I73.9 PVD (PERIPHERAL VASCULAR DISEASE) (HCC): ICD-10-CM

## 2024-07-11 DIAGNOSIS — N18.32 CHRONIC KIDNEY DISEASE, STAGE 3B (HCC): ICD-10-CM

## 2024-07-11 DIAGNOSIS — F03.90 DEMENTIA WITHOUT BEHAVIORAL DISTURBANCE (HCC): ICD-10-CM

## 2024-07-11 PROBLEM — I73.89 OTHER SPECIFIED PERIPHERAL VASCULAR DISEASES: Status: ACTIVE | Noted: 2024-07-11

## 2024-07-11 PROBLEM — I73.89 OTHER SPECIFIED PERIPHERAL VASCULAR DISEASES (HCC): Status: ACTIVE | Noted: 2024-07-11

## 2024-07-11 PROCEDURE — 99214 OFFICE O/P EST MOD 30 MIN: CPT | Performed by: STUDENT IN AN ORGANIZED HEALTH CARE EDUCATION/TRAINING PROGRAM

## 2024-07-11 PROCEDURE — G0439 PPPS, SUBSEQ VISIT: HCPCS | Performed by: STUDENT IN AN ORGANIZED HEALTH CARE EDUCATION/TRAINING PROGRAM

## 2024-07-11 RX ORDER — MIRABEGRON 50 MG/1
TABLET, FILM COATED, EXTENDED RELEASE ORAL
COMMUNITY
Start: 2024-06-12

## 2024-07-11 NOTE — PROGRESS NOTES
Subjective:   Tasha Ca is a 89 year old female who presents for a Subsequent Annual Wellness visit (Pt already had Initial Annual Wellness) and scheduled follow up of multiple significant but stable problems.     Patient's daughter Gail is here.  Used to live in [place].     Doing well overall.    Appetite is good. Drinking water.    Occasional falls. No recent injury. Sometimes if trying to move too quickly will feel off balance.    Will go for walks around the floor.    Taking atorvastatin 10mg at nighttime.    Has seen neurology.    Follows with Dr. Huynh, for kidneys.    Keeping up with vaccines.     Previously worked with medical field.     Follows with cardiologist.     Wears glasses intermittently.     Wt Readings from Last 6 Encounters:   07/11/24 146 lb 13.2 oz (66.6 kg)   03/20/23 138 lb (62.6 kg)   09/19/22 140 lb 12.8 oz (63.9 kg)   05/19/22 139 lb (63 kg)   01/20/22 139 lb (63 kg)   01/18/22 139 lb (63 kg)       History/Other:   Fall Risk Assessment:   She has been screened for Falls and is High Risk. Fall Prevention information provided to patient in After Visit Summary.    Do you feel unsteady when standing or walking?: Yes  Do you worry about falling?: No  Have you fallen in the past year?: Yes  How many times have you fallen?: 3  Were you injured?: No     Cognitive Assessment:   Abnormal  What day of the week is this?: Incorrect (Patient is in memory care)  What month is it?: Incorrect  What year is it?: Incorrect  Recall \"Ball\": Incorrect  Recall \"Flag\": Incorrect  Recall \"Tree\": Incorrect    Functional Ability/Status:   Tasha Ca has some abnormal functions as listed below:  She has Dressing and/or Bathing issues based on screening of functional status.  Difficulty dressing or bathing?: No  Bathing or Showering: Need some help  Dressing: Need some help  She has Driving difficulties based on screening of functional status. She has Meal Preparation difficulties based on screening of  functional status.She has difficulties Managing Money/Bills based on screening of functional status.She has difficulties Shopping for Groceries based on screening of functional status. She has difficulties Taking Meds as Rx'd based on screening of functional status. She has problems with Memory based on screening of functional status.       Depression Screening (PHQ):  PHQ-2 SCORE: 0  , done 7/11/2024       Advanced Directives:   She does have a Living Will but we do NOT have it on file in Epic.    She has a Power of  for Health Care on file in [facility].  Patient has Advance Care Planning documents but we do not have a copy in EMR. Discussed Advanced Care Planning with patient and instructed patient to get our office a copy to be scanned into EMR.      Patient Active Problem List   Diagnosis    Benign essential hypertension    Pure hypercholesterolemia    Presence of cardiac pacemaker    Urge urinary incontinence    Vitamin D deficiency    Fall    Generalized weakness    Functional gait abnormality    Dementia without behavioral disturbance (HCC)    Atrioventricular block, complete (HCC)    PVD (peripheral vascular disease) (HCC)    Chronic kidney disease, stage 3b (HCC)     Allergies:  She is allergic to influenza vaccines.    Current Medications:  Outpatient Medications Marked as Taking for the 7/11/24 encounter (Office Visit) with Vera Mckeon MD   Medication Sig    MYRBETRIQ 50 MG Oral Tablet 24 Hr     tolterodine ER 2 MG Oral Capsule SR 24 Hr Take 1 capsule (2 mg total) by mouth daily.    metoprolol succinate ER 50 MG Oral Tablet 24 Hr Take 1 tablet (50 mg total) by mouth daily.    atorvastatin 10 MG Oral Tab Take 1 tablet (10 mg total) by mouth daily.    OLANZapine 2.5 MG Oral Tab 1 tab daily prn for anxiety    donepezil 5 MG Oral Tab TAKE 1 TABLET BY MOUTH DAILY    memantine (NAMENDA) 10 MG Oral Tab Take 1 tab po bid    ALPRAZolam 0.25 MG Oral Tab Take 1 tablet (0.25 mg total) by mouth 3 (three)  times daily as needed.    Cholecalciferol 100 MCG (4000 UT) Oral Cap Take 4,000 Units by mouth daily.       Medical History:  She  has a past medical history of Age-related cognitive decline, Arrhythmia, Chronic kidney disease, Essential hypertension, Hyperlipidemia, and Pacemaker.  Surgical History:  She  has a past surgical history that includes Cardiac pacemaker placement.   Family History:  Her family history is not on file.  Social History:  She  reports that she quit smoking about 54 years ago. Her smoking use included cigarettes. She has never used smokeless tobacco. She reports that she does not currently use alcohol. She reports that she does not use drugs.    Tobacco:  She smoked tobacco in the past but quit greater than 12 months ago.  Social History     Tobacco Use   Smoking Status Former    Current packs/day: 0.00    Types: Cigarettes    Quit date: 1970    Years since quittin.5   Smokeless Tobacco Never          CAGE Alcohol Screen:   CAGE screening score of 0 on 2024, showing low risk of alcohol abuse.      Patient Care Team:  Eduard Alvarado MD as PCP - General  Kerline Juarez PA (Physician Assistant)  Yaa Huynh MD (NEPHROLOGY)  Forrest Berrios MD (NEUROLOGY)    Review of Systems  GENERAL: feels well otherwise  SKIN: denies any unusual skin lesions  EYES: denies blurred vision or double vision  HEENT: denies nasal congestion, sinus pain or ST  LUNGS: denies shortness of breath with exertion  CARDIOVASCULAR: denies chest pain on exertion  GI: denies abdominal pain, denies heartburn  : denies dysuria, vaginal discharge or itching, no complaint of urinary incontinence   MUSCULOSKELETAL: denies back pain  NEURO: denies headaches  PSYCHE: denies depression or anxiety  HEMATOLOGIC: denies hx of anemia  ENDOCRINE: denies thyroid history  ALL/ASTHMA: denies hx of allergy or asthma    Objective:   Physical Exam  General Appearance:  Alert, cooperative, no distress, appears stated  age   Head:  Normocephalic, without obvious abnormality, atraumatic   Eyes:  PERRL, conjunctiva/corneas clear, EOM's intact both eyes   Ears:  Normal TM's and external ear canals, both ears   Nose: Nares normal, septum midline,mucosa normal   Throat: Lips, mucosa, and tongue normal; teeth and gums normal   Neck: Supple, symmetrical, trachea midline, no adenopathy;  thyroid: not enlarged, symmetric, no tenderness/mass/nodules   Back:   Symmetric, no curvature, ROM normal   Lungs:   Clear to auscultation bilaterally, respirations unlabored   Heart:  Regular rate and rhythm, S1 and S2 normal, no murmur, rub, or gallop   Abdomen:   Soft, non-tender, bowel sounds active all four quadrants,  no masses, no organomegaly   Pelvic: Deferred   Extremities: Extremities normal, atraumatic, no cyanosis, 1+ pitting edema BLE   Pulses: 2+ and symmetric   Skin: Skin color, texture, turgor normal, no rashes or lesions   Lymph nodes: Cervic nodes normal   Neurologic: Normal       /80   Pulse 60   Temp 97.2 °F (36.2 °C) (Temporal)   Resp 16   Ht 5' (1.524 m)   Wt 146 lb 13.2 oz (66.6 kg)   BMI 28.68 kg/m²  Estimated body mass index is 28.68 kg/m² as calculated from the following:    Height as of this encounter: 5' (1.524 m).    Weight as of this encounter: 146 lb 13.2 oz (66.6 kg).    Medicare Hearing Assessment:   Hearing Screening    Screening Method: Finger Rub  Finger Rub Result: Fail (Comment: Patient is hard of hearing)               Assessment & Plan:   Tasha Ca is a 89 year old female who presents for a Medicare Assessment.     1. Encounter for Medicare annual wellness exam (Primary)  2. Dementia without behavioral disturbance (HCC)  Overview:  Following with neurology, appreciate evaluation and recommendations  3. Benign essential hypertension  Pt presents for follow up of HTN  - no red flag symptoms  - BP controlled  - continue current regimen  - RTC 6mo or sooner if needed  4. Pure  hypercholesterolemia  Patient presents for follow up of HLD  - no side effects of medications  - will continue current regimen  - low fat diet and exercise  - follow up in 6mo or sooner if needed  -     Lipid Panel; Future; Expected date: 07/11/2024  5. Atrioventricular block, complete (HCC)  Overview:  Following with cardiology, s/p pacemaker, appreciate evaluation and recommendations  6. Presence of cardiac pacemaker  7. PVD (peripheral vascular disease) (HCC)  Continue cholesterol optimization, elevate legs when able  8. Chronic kidney disease, stage 3b (HCC)  Following with nephrology, appreciate evaluation and recommendations  9. Varicose veins of both legs with edema  Elevate, gentle compression  The patient indicates understanding of these issues and agrees to the plan.  Reinforced healthy diet, lifestyle, and exercise.      Return in 6 months (on 1/11/2025) for medication follow up, or sooner if needed.     Vera Mckeon MD, 7/11/2024     Supplementary Documentation:   General Health:  In the past six months, have you lost more than 10 pounds without trying?: 2 - No  Has your appetite been poor?: No  Type of Diet: Balanced  How does the patient maintain a good energy level?: Daily Walks  How would you describe your daily physical activity?: Light  How would you describe your current health state?: Fair  How do you maintain positive mental well-being?: Social Interaction  On a scale of 0 to 10, with 0 being no pain and 10 being severe pain, what is your pain level?: 1 - (Mild)  In the past six months, have you experienced urine leakage?: 1-Yes  At any time do you feel concerned for the safety/well-being of yourself and/or your children, in your home or elsewhere?: No  Have you had any immunizations at another office such as Influenza, Hepatitis B, Tetanus, or Pneumococcal?: Yes    Health Maintenance   Topic Date Due    Zoster Vaccines (1 of 2) Never done    Pneumococcal Vaccine: 65+ Years (1 of 1 -  PCV) Never done    COVID-19 Vaccine (1 - 2023-24 season) Never done    Annual Physical  09/19/2023    Annual Depression Screening  01/01/2024    Fall Risk Screening (Annual)  01/01/2024    Influenza Vaccine (1) 10/01/2024

## 2025-06-17 ENCOUNTER — TELEPHONE (OUTPATIENT)
Dept: FAMILY MEDICINE CLINIC | Facility: CLINIC | Age: OVER 89
End: 2025-06-17

## 2025-06-17 NOTE — TELEPHONE ENCOUNTER
Spoke to patient's daughter to schedule MAW, daughter stated patient has gone into hospice and has her own doctors  
No

## 2025-07-16 ENCOUNTER — TELEPHONE (OUTPATIENT)
Dept: FAMILY MEDICINE CLINIC | Facility: CLINIC | Age: OVER 89
End: 2025-07-16

## 2025-07-16 NOTE — TELEPHONE ENCOUNTER
I called the patients main number to schedule an MAW. I spoke with the daughter Alexandria who states the patient passed away as of 7/15/25.

## (undated) DIAGNOSIS — E87.5 HYPERKALEMIA: Primary | ICD-10-CM

## (undated) DIAGNOSIS — I10 BENIGN ESSENTIAL HYPERTENSION: ICD-10-CM

## (undated) DIAGNOSIS — N39.41 URGE URINARY INCONTINENCE: ICD-10-CM

## (undated) NOTE — LETTER
Date: 4/13/2022    Patient Name: Savanna Bowman          To Whom It May Concern: This letter has been written at the patient's request. The above patient was seen at the [facility] for treatment of Dementia that seems to be consistent with late onset Alzheimer's Disease. She is currently on Namenda and Aricept for her memory. If there are any additional questions, please do not hesitate to contact our office at [phone].           Sincerely,        Sosa Dubon PA-C

## (undated) NOTE — LETTER
10/18/21        Cecil Burleson  [address]      Dear Sterling Rocha,    Our records indicate that you have outstanding lab work and or testing that was ordered for you and has not yet been completed:  Orders Placed This Encount